# Patient Record
Sex: FEMALE | Race: WHITE
[De-identification: names, ages, dates, MRNs, and addresses within clinical notes are randomized per-mention and may not be internally consistent; named-entity substitution may affect disease eponyms.]

---

## 2017-04-11 ENCOUNTER — HOSPITAL ENCOUNTER (OUTPATIENT)
Dept: HOSPITAL 53 - M PAIN | Age: 57
End: 2017-04-11
Attending: NURSE PRACTITIONER
Payer: COMMERCIAL

## 2017-04-11 DIAGNOSIS — R51: ICD-10-CM

## 2017-04-11 DIAGNOSIS — N30.10: ICD-10-CM

## 2017-04-11 DIAGNOSIS — Z88.1: ICD-10-CM

## 2017-04-11 DIAGNOSIS — F41.9: ICD-10-CM

## 2017-04-11 DIAGNOSIS — F40.240: ICD-10-CM

## 2017-04-11 DIAGNOSIS — Z79.82: ICD-10-CM

## 2017-04-11 DIAGNOSIS — F32.9: ICD-10-CM

## 2017-04-11 DIAGNOSIS — Z90.49: ICD-10-CM

## 2017-04-11 DIAGNOSIS — Z88.0: ICD-10-CM

## 2017-04-11 DIAGNOSIS — M54.5: ICD-10-CM

## 2017-04-11 DIAGNOSIS — E11.9: ICD-10-CM

## 2017-04-11 DIAGNOSIS — I10: ICD-10-CM

## 2017-04-11 DIAGNOSIS — Z79.891: ICD-10-CM

## 2017-04-11 DIAGNOSIS — Z09: Primary | ICD-10-CM

## 2017-04-11 DIAGNOSIS — Z88.8: ICD-10-CM

## 2017-04-11 DIAGNOSIS — R10.10: ICD-10-CM

## 2017-04-11 DIAGNOSIS — Z79.899: ICD-10-CM

## 2017-04-12 NOTE — ECWPNPC
PATIENT NAME: JORDANA MILLER

: 1960

GENDER: FEMALE

MRN: [id number]

VISIT DATE: 2017

DISCHARGE DATE: 17 1033

VISIT LOCKED DATE TIME: 

PHYSICIAN: MONICA VILLAREAL  

RESOURCE: MONCIA VILLAREAL  

 

           

           

HISTORY OF PRESENT ILLNESS

           

      HISTORY OF PRESENT ILLNESS: HERE FOR F/U AND

          MANAGEMENT OF PERSISTENT ABDOMINAL PAIN. RATING PAIN VAS 8/10.

          HAD BILE DUCT SURGERY DUE TO DUCT ENLARGEMENT . HAS LARGE

          UPPER ABDOMINAL INCISION.HAVING LESS INTENSE RIGHT UPPER

          ABDOMINAL PAIN SINCE SURGERY. CURRENTLY USING MS CONTIN 15MG 1 IN

          AM AND 2HS, MSIR 15MG Q8H PRN,TRAMADOL 50MG 2 TAB QID,GABAPENTIN

          300MG TID AND TIZANIDINE 4MG QID.FINDS MEDICATION SOMEWHAT

          EFFECTIVE AND DENIES SIDE EFFECTS.REPORTING BOWEL AND BLADDER

          FUNCTION IS NORMAL.

      PAIN

           

           

          THE PATIENT DESCRIBES THE PAIN...

           

      FALL RISK SCREENING:

      SCREENING

           

           

          :NO FALLS IN THE PAST YEAR

           

CURRENT MEDICATIONS

           

          TAKING METOPROLOL TARTRATE 100 MG TABLET ORALLY

          TAKING OMEPRAZOLE 20 MG ORALLY ONCE A DAY

          TAKING AMITRIPTYLINE HCL 10 MG TABLET 3 TABLET ORALLY AT BEDTIME

          TAKING ALBUTEROL SULFATE  (90 BASE) MCG/ACT AEROSOL

          SOLUTION 2 PUFFS AS NEEDED INHALATION EVERY 4 HRS

          TAKING ASPIRIN ADULT LOW STRENGTH 81 MG 1 TABLET ORALLY ONCE A

          DAY

          TAKING MULTIVITAMINS TABLET ORALLY DAILY

          TAKING NITROGLYCERIN 0.4 MG SUBLINGUAL AS NEEDED

          TAKING VITAMIN D TABLET 500 UNITS ORALLY DAILY

          TAKING MORPHINE SULFATE ER 30 MG TABLET EXTENDED RELEASE 1-2 AS

          DIRECTED ORALLY 1 IN AM,2 AT HS MDD3

          TAKING AMITRIPTYLINE HCL 25 MG TABLET 3 ORALLY ONCE A DAY

          TAKING TRAMADOL HCL 50 MG TABLET 2 ORALLY Q6H PRN MDD8, NOTES:

          MAX DAILY DOSE 4 PILLS

          TAKING MORPHINE SULFATE ER 15 MG TABLET EXTENDED RELEASE 1 TABLET

          ORALLY 1 IN AM, 2 IN PM MDD3

          TAKING MORPHINE SULFATE 15 MG TABLET 1 TABLET AS NEEDED ORALLY

          Q8H PRN MDD3

          TAKING GABAPENTIN 300 MG CAPSULE 1 CAPSULE ORALLY THREE TIMES A

          DAY

          TAKING TIZANIDINE HCL 4 MG TABLET 1 TABLET AS NEEDED ORALLY FOUR

          TIMES DAILY PRN

          NOT-TAKING METFORMIN 500 MG 1 TAB ORAL DAILY

          UNKNOWN HYDROCODONE-ACETAMINOPHEN 5-325 MG TABLET 1 TABLET AS

          NEEDED ORALLY EVERY 4 HRS

          UNKNOWN ACETAMINOPHEN 500 MG TABLET 2 TABLET AS NEEDED ORALLY AS

          NEEDED

          UNKNOWN AMLODIPINE 5MG TABLET ORAL DAILY

          UNKNOWN CHLORTHALIDONE 25 MG TABLET 1 TABLET IN THE MORNING

          ORALLY ONCE A DAY

          UNKNOWN LISINOPRIL 20 MG TABLET 1 TABLET ORALLY ONCE A DAY

          MEDICATION LIST REVIEWED AND RECONCILED WITH THE PATIENT

           

PAST MEDICAL HISTORY

           

          HEADACHE

          HTN

          GERD

          HIATAL HERNIA

          ANXIETY

          DEPRESSION

          CLAUSTROPHOBIA

          INTERSTITAL CYSTITIS

           

ALLERGIES

           

          PENICILLIN (FOR ALLERGIES USE ONLY): THROAT SWELL

          CODEINE PHOSPHATE (FOR ALLERGIES USE ONLY): RASH

          VANCOMYCIN HCL: RASH

          COMPAZINE: HYPER

          CYMBALTA: GO CRAZY

          LYRICA: GO CRAZY

           

SURGICAL HISTORY

           

          HYSTERECTOMY 1984

          APPY 1971

          AMADOR 1985

          CONE BX 1977

          MULTIPLE BLADDER DILATIONS

          3-4 LAPEROSCOPIES

          NERVE STIMULATOR IMPLANTED

          BOWEL SURGERY 2017

           

SOCIAL HISTORY

           

          GENERAL:

           

          PAIN CLINIC PFS, CLERGY, PUBLIC HEALTH REFERRALS

          CLERGY REFERRAL NEEDED?NO

          WAS THE PROVIDER NOTIFIED OF ANY PERTINENT INFO?NO

          PFS REFERRAL NEEDED?NO

          PUBLIC HEALTH REFERRAL NEEDED?NO

           

           

          PATIENT: ____.

           

REVIEW OF SYSTEMS

           

      CONSTITUTIONAL:

           

          ANY CHANGE IN YOUR MEDICAL CONDITION? NO . CHILLS NO . FEVER NO .

           

      INFECTION:

           

          DO YOU HAVE NEW INFECTIONS? NO . DO YOU HAVE HISTORY OF MRSA? NO

          .

           

      MUSCULOSKELETAL:

           

          ANY NEW PATTERNS OF PAIN OR NUMBNESS? NO .

           

      GASTROENTEROLOGY:

           

          ANY NEW CHANGE IN BOWEL CONTROL? NO .

           

      GENITOURINARY:

           

          ANY NEW CHANGE IN BLADDER CONTROL? NO . IS THERE A CHANCE YOU

          COULD BE PREGNANT? NO .

           

      HEMATOLOGY/LYMPH:

           

          DO YOU TAKE ANY BLOOD THINNERS? (FOR EXAMPLE- COUMADIN, PLAVIX,

          AGGRENOX, PLATEL, PRADAXA, OR XARELTO) NO . WHEN WAS YOUR LAST

          DOSE? DATE: TIME: .

           

      NEUROLOGY:

           

          HAVE YOU FALLEN IN THE PAST 6 MONTHS? NO . ANY NEW EXTREMITY

          NUMBNESS OR WEAKNESS? NO .

           

      CARDIOLOGY:

           

          DO YOU HAVE A PACEMAKER OR DEFIBRILLATOR? NO .

           

      RESPIRATORY:

           

          HAVE YOU BEEN SICK IN THE PAST WEEK? NO . FEVER NO . FLU LIKE

          SYMPTOMS? NO . COUGH NO .

           

      INTEGUMENTARY:

           

          DO YOU HAVE ANY RASHES OR OPEN SORES? NO .

           

      ALLERGIC/IMMUNO:

           

          ARE YOU ALLERGIC TO SHELLFISH OR IV DYE? NO . ANY NEW ALLERGIES?

          NO .

           

      PSYCHIATRIC:

           

          DO YOU HAVE THOUGHTS OF HURTING YOURSELF OR SOMEONE ELSE? NO .

          ARE YOU ABUSED, NEGLECTED, OR IN AN UNSAFE ENVIRONMENT? NO .

           

      ENDOCRINOLOGY:

           

          ARE YOU DIABETIC? YES .

           

      OTHER:

           

          DO YOU NEED ANY PRESCRIPTIONS? YES SHORT ACTING AND LONG ACTING

          MORPHING . IF YES, PLEASE LIST: ____ . ANY NEW PROBLEMS WITH YOUR

          MEDICATIONS? NO . WHEN DID YOU LAST EAT? ____ . WHEN DID YOU LAST

          DRINK? ____ . WHAT DID YOU LAST DRINK? ____ . NAME OF PERSON

          DRIVING YOU HOME? ____ . DO YOU HAVE ANY OTHER QUESTIONS OR

          CONCERNS NO .

           

      REVIEWED BY:

           

          PROVIDER: MONICA CORBETT .

           

VITAL SIGNS

           

          .2 LBS, HT 64 IN, BMI 27.67 INDEX, /74 MM HG, 

          /MIN, RR 18 /MIN, TEMP 97.4 F, OXYGEN SAT % 95%, NA INITIALS SC

          10:03.

           

EXAMINATION

           

      GENERAL EXAMINATION:

          LUNGS:LUNG SOUNDS ARE CLEAR.

           

          HEART:HEART RATE REGULAR.

           

          ABDOMEN:TENDER TO PALPATION, DISTENDED.

           

          MUSCULOSKELETAL:*MUSCLE STRENGTH TESTING 5/5 BILATERAL, ,

          PALPATION: POSITIVE FOR PAIN OVER L/S SPINE. POSITIVE FOR PAIN

          OVER L/S PARSPINALS.

           

ASSESSMENTS

           

          INTERSTITIAL CYSTITIS - N30.10 (PRIMARY)

           

          CHRONIC PRESCRIPTION OPIATE USE - Z79.891

           

TREATMENT

           

      INTERSTITIAL CYSTITIS

          CONTINUE AMITRIPTYLINE HCL TABLET, 25 MG, 3, ORALLY, ONCE A DAY

          CONTINUE TRAMADOL HCL TABLET, 50 MG, 2, ORALLY, Q6H PRN MDD8,

          NOTES: MAX DAILY DOSE 4 PILLS

          REFILL MORPHINE SULFATE ER TABLET EXTENDED RELEASE, 15 MG, 1

          TABLET, ORALLY, 1 IN AM, 2 IN PM MDD3, 30 DAY(S), 90, REFILLS 0

          REFILL MORPHINE SULFATE TABLET, 15 MG, 1 TABLET AS NEEDED,

          ORALLY, Q8H PRN MDD3, 30 DAY(S), 90, REFILLS 0

          NOTES: ISTOP REGISTRY REVIEWED AND DEMNOSTRATES COMPLLIANCE.

          BRINGS IN MEDICATIONS WHICH IS APPROPRIATE FOR WHAT WAS

          DISPENSED. RECENT URINE TOXICOLOGY REVIEWED. NO UNAUTHORIZED

          MEDICATIONS. NO ILLICIT SUBSTANCES AND PRESCRIBED MEDICATIONS

          WERE PRESENT. , RISKS AND BENEFITS OF NARCOTIC/OPIOD MEDICATIONS

          WERE REVIEWED WITH PATIENT - THIS INCLUDES BUT IS NOT LIMITED TO

          RISK OF DEPENDANCE/DEVELOPMENT OF ADDICTION, MOOD DISTURBANCE AND

          DEPRESSION, OSTEOPOROSIS, HORMONAL AND LABIDAL CHANGES,

          RESPIRATORY DEPRESSION AND DEATH. PATIENT IS ADVISED NOT TO DRIVE

          WHILE ON THESE MEDICATIONS.

           

PROCEDURE CODES

           

           ESTABILISHED PATIENT [facility] CHARGE

           

DISPOSITION & COMMUNICATION

           

FOLLOW UP

           

          2 MONTHS

           

 

ELECTRONICALLY SIGNED BY CHELLE RODRIGUEZ ON

          2017 AT 12:27 PM EDT

           

           

           

 

DISCLAIMER :

THIS IS A VISIT SUMMARY EXTRACTED FROM THE [employer] CHART.

IT IS NOT A COPY OF THE [employer] PROGRESS NOTE.

DWAINE

## 2017-06-12 ENCOUNTER — HOSPITAL ENCOUNTER (OUTPATIENT)
Dept: HOSPITAL 53 - M PAIN | Age: 57
End: 2017-06-12
Attending: NURSE PRACTITIONER
Payer: COMMERCIAL

## 2017-06-12 DIAGNOSIS — F40.240: ICD-10-CM

## 2017-06-12 DIAGNOSIS — Z88.1: ICD-10-CM

## 2017-06-12 DIAGNOSIS — F41.9: ICD-10-CM

## 2017-06-12 DIAGNOSIS — K21.9: ICD-10-CM

## 2017-06-12 DIAGNOSIS — F32.9: ICD-10-CM

## 2017-06-12 DIAGNOSIS — I10: ICD-10-CM

## 2017-06-12 DIAGNOSIS — Z79.891: ICD-10-CM

## 2017-06-12 DIAGNOSIS — Z79.899: ICD-10-CM

## 2017-06-12 DIAGNOSIS — E11.9: ICD-10-CM

## 2017-06-12 DIAGNOSIS — Z88.5: ICD-10-CM

## 2017-06-12 DIAGNOSIS — G89.29: Primary | ICD-10-CM

## 2017-06-12 DIAGNOSIS — Z88.0: ICD-10-CM

## 2017-06-12 DIAGNOSIS — R51: ICD-10-CM

## 2017-06-12 DIAGNOSIS — K44.9: ICD-10-CM

## 2017-06-12 DIAGNOSIS — N30.10: ICD-10-CM

## 2017-06-12 DIAGNOSIS — Z91.013: ICD-10-CM

## 2017-06-12 DIAGNOSIS — Z79.82: ICD-10-CM

## 2017-06-12 DIAGNOSIS — Z88.8: ICD-10-CM

## 2017-06-14 NOTE — ECWPNPC
PATIENT NAME: JORDANA MILLER

: 1960

GENDER: FEMALE

MRN: [id number]

VISIT DATE: 2017

DISCHARGE DATE: 17 1441

VISIT LOCKED DATE TIME: 

PHYSICIAN: MONICA VILLAREAL  

RESOURCE: MONICA VILLAREAL  

 

           

           

REASON FOR APPOINTMENT

           

          1. BLADDER/BACK

           

HISTORY OF PRESENT ILLNESS

           

      HISTORY OF PRESENT ILLNESS: HERE FOR F/U AND

          MANAGEMENT OF PERSISTENT ABDOMINAL PAIN. RATING PAIN VAS 7/10.

          HAD BILE DUCT SURGERY DUE TO DUCT ENLARGEMENT . HAS LARGE

          UPPER ABDOMINAL INCISION.HAVING LESS INTENSE RIGHT UPPER

          ABDOMINAL PAIN SINCE SURGERY. CURRENTLY USING MS CONTIN 15MG 1 IN

          AM AND 2HS, MSIR 15MG Q8H PRN,TRAMADOL 50MG 2 TAB QID,GABAPENTIN

          300MG TID AND TIZANIDINE 4MG QID.FINDS MEDICATION SOMEWHAT

          EFFECTIVE AND DENIES SIDE EFFECTS.REPORTING BOWEL AND BLADDER

          FUNCTION IS NORMAL.

      PAIN

           

           

          THE PATIENT DESCRIBES THE PAIN...

           

      FALL RISK SCREENING:

      SCREENING

           

           

          :NO FALLS IN THE PAST YEAR

           

CURRENT MEDICATIONS

           

          TAKING METOPROLOL TARTRATE 100 MG TABLET ORALLY BID

          TAKING OMEPRAZOLE 20 MG ORALLY ONCE A DAY

          TAKING ALBUTEROL SULFATE  (90 BASE) MCG/ACT AEROSOL

          SOLUTION 2 PUFFS AS NEEDED INHALATION EVERY 4 HRS

          TAKING ASPIRIN ADULT LOW STRENGTH 81 MG 1 TABLET ORALLY ONCE A

          DAY

          TAKING MULTIVITAMINS TABLET ORALLY DAILY

          TAKING NITROGLYCERIN 0.4 MG SUBLINGUAL AS NEEDED

          TAKING AMITRIPTYLINE HCL 25 MG TABLET 3 ORALLY ONCE A DAY

          TAKING TRAMADOL HCL 50 MG TABLET 2 ORALLY Q6H PRN MDD8, NOTES:

          MAX DAILY DOSE 4 PILLS

          TAKING MORPHINE SULFATE ER 15 MG TABLET EXTENDED RELEASE 1 TABLET

          ORALLY 1 IN AM, 2 IN PM MDD3

          TAKING MORPHINE SULFATE 15 MG TABLET 1 TABLET AS NEEDED ORALLY

          Q8H PRN MDD3

          TAKING GABAPENTIN 300 MG CAPSULE 1 CAPSULE ORALLY THREE TIMES A

          DAY

          TAKING TIZANIDINE HCL 4 MG TABLET 1 TABLET AS NEEDED ORALLY FOUR

          TIMES DAILY PRN

          NOT-TAKING MORPHINE SULFATE ER 30 MG TABLET EXTENDED RELEASE 1-2

          AS DIRECTED ORALLY 1 IN AM,2 AT HS MDD3

          NOT-TAKING AMITRIPTYLINE HCL 10 MG TABLET 3 TABLET ORALLY AT

          BEDTIME

          NOT-TAKING VITAMIN D TABLET 500 UNITS ORALLY DAILY

          NOT-TAKING METFORMIN 500 MG 1 TAB ORAL DAILY

          UNKNOWN HYDROCODONE-ACETAMINOPHEN 5-325 MG TABLET 1 TABLET AS

          NEEDED ORALLY EVERY 4 HRS

          UNKNOWN ACETAMINOPHEN 500 MG TABLET 2 TABLET AS NEEDED ORALLY AS

          NEEDED

          UNKNOWN AMLODIPINE 5MG TABLET ORAL DAILY

          UNKNOWN CHLORTHALIDONE 25 MG TABLET 1 TABLET IN THE MORNING

          ORALLY ONCE A DAY

          UNKNOWN LISINOPRIL 20 MG TABLET 1 TABLET ORALLY ONCE A DAY

          MEDICATION LIST REVIEWED AND RECONCILED WITH THE PATIENT

           

PAST MEDICAL HISTORY

           

          HEADACHE

          HTN

          GERD

          HIATAL HERNIA

          ANXIETY

          DEPRESSION

          CLAUSTROPHOBIA

          INTERSTITAL CYSTITIS

           

ALLERGIES

           

          PENICILLIN (FOR ALLERGIES USE ONLY): THROAT SWELL: ALLERGY

          CODEINE PHOSPHATE (FOR ALLERGIES USE ONLY): RASH: ALLERGY

          VANCOMYCIN HCL: RASH: ALLERGY

          COMPAZINE: HYPER: SIDE EFFECTS

          CYMBALTA: GO CRAZY: SIDE EFFECTS

          LYRICA: GO CRAZY

          SHELL FISH, IV DYE: THROAT SWELLS: ALLERGY

           

REVIEW OF SYSTEMS

           

      CONSTITUTIONAL:

           

          ANY CHANGE IN YOUR MEDICAL CONDITION? NO . CHILLS NO . FEVER NO .

           

      INFECTION:

           

          DO YOU HAVE NEW INFECTIONS? NO . DO YOU HAVE HISTORY OF MRSA? NO

          .

           

      MUSCULOSKELETAL:

           

          ANY NEW PATTERNS OF PAIN OR NUMBNESS? NO .

           

      GASTROENTEROLOGY:

           

          ANY NEW CHANGE IN BOWEL CONTROL? NO .

           

      GENITOURINARY:

           

          ANY NEW CHANGE IN BLADDER CONTROL? NO . IS THERE A CHANCE YOU

          COULD BE PREGNANT? NO .

           

      HEMATOLOGY/LYMPH:

           

          DO YOU TAKE ANY BLOOD THINNERS? (FOR EXAMPLE- COUMADIN, PLAVIX,

          AGGRENOX, PLATEL, PRADAXA, OR XARELTO) NO . WHEN WAS YOUR LAST

          DOSE? DATE: TIME: .

           

      NEUROLOGY:

           

          HAVE YOU FALLEN IN THE PAST 6 MONTHS? NO . ANY NEW EXTREMITY

          NUMBNESS OR WEAKNESS? NO .

           

      CARDIOLOGY:

           

          DO YOU HAVE A PACEMAKER OR DEFIBRILLATOR? NO .

           

      RESPIRATORY:

           

          HAVE YOU BEEN SICK IN THE PAST WEEK? NO . FEVER NO . FLU LIKE

          SYMPTOMS? NO . COUGH NO .

           

      INTEGUMENTARY:

           

          DO YOU HAVE ANY RASHES OR OPEN SORES? YES, HEALING ABD INCISION .

           

      ALLERGIC/IMMUNO:

           

          ARE YOU ALLERGIC TO SHELLFISH OR IV DYE? YES, BOTH . ANY NEW

          ALLERGIES? NO .

           

      PSYCHIATRIC:

           

          DO YOU HAVE THOUGHTS OF HURTING YOURSELF OR SOMEONE ELSE? NO .

          ARE YOU ABUSED, NEGLECTED, OR IN AN UNSAFE ENVIRONMENT? NO .

           

      ENDOCRINOLOGY:

           

          ARE YOU DIABETIC? YES, DIET CONTROLLED .

           

      OTHER:

           

          DO YOU NEED ANY PRESCRIPTIONS? YES . IF YES, PLEASE LIST:

          MORPHINE LONG AND SHORT ACTING, AMITRIPTYLINE . ANY NEW PROBLEMS

          WITH YOUR MEDICATIONS? NO . WHEN DID YOU LAST EAT? ____ . WHEN

          DID YOU LAST DRINK? ____ . WHAT DID YOU LAST DRINK? ____ . NAME

          OF PERSON DRIVING YOU HOME? ____ . DO YOU HAVE ANY OTHER

          QUESTIONS OR CONCERNS NO .

           

      REVIEWED BY:

           

          PROVIDER: MONICA CORBETT .

           

VITAL SIGNS

           

          .8 LBS, HT 64 IN, BMI 27.25 INDEX, BP 96/64 MM HG, REPEAT

          /70 MANUAL, HR 66 /MIN, RR 16 /MIN, TEMP 97.6 F, OXYGEN SAT

          % 93%, NA INITIALS TL 1343, REVIEWED BY: AD.

           

EXAMINATION

           

      GENERAL EXAMINATION:

          LUNGS:LUNG SOUNDS ARE CLEAR.

           

          HEART:HEART RATE REGULAR.

           

          ABDOMEN:TENDER TO PALPATION, DISTENDED.

           

          MUSCULOSKELETAL:*MUSCLE STRENGTH TESTING 5/5 BILATERAL, ,

          PALPATION: POSITIVE FOR PAIN OVER L/S SPINE. POSITIVE FOR PAIN

          OVER L/S PARSPINALS.

           

ASSESSMENTS

           

          INTERSTITIAL CYSTITIS - N30.10 (PRIMARY)

           

          CHRONIC PRESCRIPTION OPIATE USE - Z79.891

           

TREATMENT

           

      INTERSTITIAL CYSTITIS

          REFILL MORPHINE SULFATE ER TABLET EXTENDED RELEASE, 30 MG, 1-2 AS

          DIRECTED, ORALLY, 1 IN AM,2 AT HS MDD3, 30 DAY(S), 90, REFILLS 0

          REFILL MORPHINE SULFATE TABLET, 15 MG, 1 TABLET AS NEEDED,

          ORALLY, Q8H PRN MDD3, 30 DAY(S), 90, REFILLS 0

          REFILL TRAMADOL HCL TABLET, 50 MG, 2, ORALLY, Q6H PRN MDD8, 30

          DAY(S), 240, REFILLS 2, NOTES: MAX DAILY DOSE 4 PILLS

          CONTINUE AMITRIPTYLINE HCL TABLET, 25 MG, 3, ORALLY, ONCE A DAY

          CONTINUE GABAPENTIN CAPSULE, 300 MG, 1 CAPSULE, ORALLY, THREE

          TIMES A DAY

          CONTINUE TIZANIDINE HCL TABLET, 4 MG, 1 TABLET AS NEEDED, ORALLY,

          FOUR TIMES DAILY PRN

          NOTES: ISTOP REGISTRY REVIEWED AND DEMNOSTRATES COMPLLIANCE.

          BRINGS IN MEDICATIONS WHICH IS APPROPRIATE FOR WHAT WAS

          DISPENSED. RECENT URINE TOXICOLOGY REVIEWED. NO UNAUTHORIZED

          MEDICATIONS. NO ILLICIT SUBSTANCES AND PRESCRIBED MEDICATIONS

          WERE PRESENT. , RISKS AND BENEFITS OF NARCOTIC/OPIOD MEDICATIONS

          WERE REVIEWED WITH PATIENT - THIS INCLUDES BUT IS NOT LIMITED TO

          RISK OF DEPENDANCE/DEVELOPMENT OF ADDICTION, MOOD DISTURBANCE AND

          DEPRESSION, OSTEOPOROSIS, HORMONAL AND LABIDAL CHANGES,

          RESPIRATORY DEPRESSION AND DEATH. PATIENT IS ADVISED NOT TO DRIVE

          WHILE ON THESE MEDICATIONS.URINE TOX TODAY.PATIENT WAS NOT ABLE

          TO URINATE.JORDANA ADMITS THAT SHE OVERTOOK MSIR 15MG OVER THE PAST

          6 WEEKS AND HAS BEEN OUT FOR TWO WEEKS.SHE IS NOT DUE UNTIL

          .HER ABDOMINAL INCISION IS VERY RED TODAY WITH SOME

          EXUDATE.JOSE INFORMED HER THAT THIS NEEDS TO BE LOOKED AT BY

          URGENT CARE.INFORMED HER AND HER  ABOUT MY CONCERN FOR HER

          OVERTAKING MSIR 15MG AND HAVE ADVISED HER TO CUT BACK,WHICH SHE

          IS RECEPTIVE TO.SHE WILL F/U IN ONE MONTH.ALSO DIDNT BRING HER

          MEDICATION WITH HER TODAY.

           

PROCEDURE CODES

           

           ESTABILISHED PATIENT [facility] CHARGE

           

DISPOSITION & COMMUNICATION

           

FOLLOW UP

           

          4 WEEKS

           

 

ELECTRONICALLY SIGNED BY CHELLE RODRIGUEZ ON

          2017 AT 04:50 PM EDT

           

           

           

 

DISCLAIMER :

THIS IS A VISIT SUMMARY EXTRACTED FROM THE [employer] CHART.

IT IS NOT A COPY OF THE [employer] PROGRESS NOTE.

DWAINE

## 2017-08-01 ENCOUNTER — HOSPITAL ENCOUNTER (OUTPATIENT)
Dept: HOSPITAL 53 - M PAIN | Age: 57
End: 2017-08-01
Attending: NURSE PRACTITIONER
Payer: MEDICAID

## 2017-08-01 DIAGNOSIS — K44.9: ICD-10-CM

## 2017-08-01 DIAGNOSIS — F41.9: ICD-10-CM

## 2017-08-01 DIAGNOSIS — Z88.0: ICD-10-CM

## 2017-08-01 DIAGNOSIS — G89.29: Primary | ICD-10-CM

## 2017-08-01 DIAGNOSIS — Z79.899: ICD-10-CM

## 2017-08-01 DIAGNOSIS — Z88.5: ICD-10-CM

## 2017-08-01 DIAGNOSIS — K21.9: ICD-10-CM

## 2017-08-01 DIAGNOSIS — Z91.013: ICD-10-CM

## 2017-08-01 DIAGNOSIS — R10.84: ICD-10-CM

## 2017-08-01 DIAGNOSIS — Z88.8: ICD-10-CM

## 2017-08-01 DIAGNOSIS — Z91.048: ICD-10-CM

## 2017-08-01 DIAGNOSIS — F32.9: ICD-10-CM

## 2017-08-01 DIAGNOSIS — F40.240: ICD-10-CM

## 2017-08-01 DIAGNOSIS — N30.10: ICD-10-CM

## 2017-08-01 DIAGNOSIS — I10: ICD-10-CM

## 2017-08-01 DIAGNOSIS — Z88.1: ICD-10-CM

## 2017-08-01 DIAGNOSIS — Z79.891: ICD-10-CM

## 2017-08-01 DIAGNOSIS — Z79.84: ICD-10-CM

## 2017-08-01 DIAGNOSIS — E11.9: ICD-10-CM

## 2017-08-18 NOTE — ECWPNPC
PATIENT NAME: JORDANA MILLER

: 1960

GENDER: FEMALE

MRN: [id number]

VISIT DATE: 2017

DISCHARGE DATE: 17 1329

VISIT LOCKED DATE TIME: 

PHYSICIAN: MONICA VILLAREAL  

RESOURCE: MONICA VILLAREAL  

 

           

           

REASON FOR APPOINTMENT

           

          1. BLADDER/BACK

           

HISTORY OF PRESENT ILLNESS

           

      HISTORY OF PRESENT ILLNESS: HERE FOR F/U AND

          MANAGEMENT OF PERSISTENT ABDOMINAL PAIN. RATING PAIN VAS 7/10.

          HAD BILE DUCT SURGERY DUE TO DUCT ENLARGEMENT . HAS LARGE

          UPPER ABDOMINAL INCISION.HAVING LESS INTENSE RIGHT UPPER

          ABDOMINAL PAIN SINCE SURGERY. CURRENTLY USING MS CONTIN 30MG 1 IN

          AM AND 2HS, MSIR 15MG Q8H PRN,TRAMADOL 50MG 2 TAB QID,GABAPENTIN

          300MG TID AND TIZANIDINE 4MG QID.FINDS MEDICATION SOMEWHAT

          EFFECTIVE AND DENIES SIDE EFFECTS.REPORTING BOWEL AND BLADDER

          FUNCTION IS NORMAL.

      PAIN

           

           

          THE PATIENT DESCRIBES THE PAIN...

           

      FALL RISK SCREENING:

      SCREENING

           

           

          :NO FALLS IN THE PAST YEAR

           

CURRENT MEDICATIONS

           

          TAKING METOPROLOL TARTRATE 100 MG TABLET ORALLY BID

          TAKING OMEPRAZOLE 20 MG ORALLY ONCE A DAY

          TAKING ALBUTEROL SULFATE  (90 BASE) MCG/ACT AEROSOL

          SOLUTION 2 PUFFS AS NEEDED INHALATION EVERY 4 HRS

          TAKING ASPIRIN ADULT LOW STRENGTH 81 MG 1 TABLET ORALLY ONCE A

          DAY

          TAKING MULTIVITAMINS TABLET 1 TAB ORALLY DAILY

          TAKING NITROGLYCERIN 0.4 MG SUBLINGUAL AS NEEDED

          TAKING GABAPENTIN 300 MG CAPSULE 1 CAPSULE ORALLY THREE TIMES A

          DAY

          TAKING TIZANIDINE HCL 4 MG TABLET 1 TABLET AS NEEDED ORALLY FOUR

          TIMES DAILY PRN

          TAKING AMITRIPTYLINE HCL 25 MG TABLET 3 TABS ORALLY ONCE A DAY

          TAKING MORPHINE SULFATE ER 30 MG TABLET EXTENDED RELEASE 1-2 AS

          DIRECTED ORALLY 1 IN AM,2 AT HS MDD3

          TAKING MORPHINE SULFATE 15 MG TABLET 1 TABLET AS NEEDED ORALLY

          Q8H PRN MDD3

          TAKING TRAMADOL HCL 50 MG TABLET 1-2 TABS ORALLY Q6H PRN MDD8,

          NOTES: MAX DAILY DOSE 4 PILLS

          NOT-TAKING MORPHINE SULFATE ER 15 MG TABLET EXTENDED RELEASE 1

          TABLET ORALLY 1 IN AM, 2 IN PM MDD3

          NOT-TAKING AMITRIPTYLINE HCL 10 MG TABLET 3 TABLET ORALLY AT

          BEDTIME

          NOT-TAKING VITAMIN D TABLET 500 UNITS ORALLY DAILY

          NOT-TAKING METFORMIN 500 MG 1 TAB ORAL DAILY

          UNKNOWN HYDROCODONE-ACETAMINOPHEN 5-325 MG TABLET 1 TABLET AS

          NEEDED ORALLY EVERY 4 HRS

          UNKNOWN ACETAMINOPHEN 500 MG TABLET 2 TABLET AS NEEDED ORALLY AS

          NEEDED

          UNKNOWN AMLODIPINE 5MG TABLET ORAL DAILY

          UNKNOWN CHLORTHALIDONE 25 MG TABLET 1 TABLET IN THE MORNING

          ORALLY ONCE A DAY

          UNKNOWN LISINOPRIL 20 MG TABLET 1 TABLET ORALLY ONCE A DAY

          MEDICATION LIST REVIEWED AND RECONCILED WITH THE PATIENT

           

PAST MEDICAL HISTORY

           

          HEADACHE

          HTN

          GERD

          HIATAL HERNIA

          ANXIETY

          DEPRESSION

          CLAUSTROPHOBIA

          INTERSTITAL CYSTITIS

           

ALLERGIES

           

          PENICILLIN (FOR ALLERGIES USE ONLY): THROAT SWELL: ALLERGY

          CODEINE PHOSPHATE (FOR ALLERGIES USE ONLY): RASH: ALLERGY

          VANCOMYCIN HCL: RASH: ALLERGY

          COMPAZINE: HYPER: SIDE EFFECTS

          CYMBALTA: GO CRAZY: SIDE EFFECTS

          LYRICA: GO CRAZY

          SHELL FISH, IV DYE: THROAT SWELLS: ALLERGY

          ADHESIVE TAPE: BLISTERS: ALLERGY

           

SOCIAL HISTORY

           

          GENERAL:

           

          TOBACCO USE

          ARE YOU A:NONSMOKER

           

           

          [employer]

          [id number]

           

           

          LEARNING BARRIERS / SPECIAL NEEDS

          BARRIERS TO LEARNING?NO

          HEARING IMPAIRED?NO

          VISION IMPAIRED?YES

          :CORRECTIVE LENSES

          COGNITIVELY IMPAIRED?NO

          READINESS TO LEARN?YES

          LEARNING PREFERENCES?NO

          LEARNING CAPABILITIES PRESENT?YES

          EMOTIONAL BARRIERS?NO

          SPECIAL DEVICES?NO

           NEEDED?NO

           

           

          PAIN CLINIC PFS, CLERGY, PUBLIC HEALTH REFERRALS

          PFS REFERRAL NEEDED?NO

          CLERGY REFERRAL NEEDED?NO

          PUBLIC HEALTH REFERRAL NEEDED?NO

          WAS THE PROVIDER NOTIFIED OF ANY PERTINENT INFO?NO

          HAS THE PATIENT BEEN EDUCATED REGARDING HIS/HER PLAN OF CARE?YES

          HAS THE PATIENT BEEN EDUCATED REGARDING PAIN, THE RISK FOR PAIN,

          THE IMPORTANCE OF EFFECTIVE PAIN MANAGEMENT, AND THE PAIN

          ASSESSMENT PROCESS?YES

           

           

          PATIENT: ____.

           

           

          ADVANCE DIRECTIVES

          HEALTH CARE PROXY?NO

          WOULD YOU LIKE MORE INFORMATION?NO

          DO YOU HAVE A DNR?NO

          WOULD YOU LIKE MORE INFORMATION?NO

          LIVING WILL?NO

          WOULD YOU LIKE MORE INFORMATION?NO

          POWER OF ?NO

          WOULD YOU LIKE MORE INFORMATION?NO

           

REVIEW OF SYSTEMS

           

      REVIEWED BY:

           

          PROVIDER: MONICA CORBETT .

           

      CONSTITUTIONAL:

           

          ANY CHANGE IN YOUR MEDICAL CONDITION? NO . CHILLS NO . FEVER NO .

           

      INFECTION:

           

          DO YOU HAVE NEW INFECTIONS? NO . DO YOU HAVE HISTORY OF MRSA? NO

          .

           

      MUSCULOSKELETAL:

           

          ANY NEW PATTERNS OF PAIN OR NUMBNESS? NO .

           

      GASTROENTEROLOGY:

           

          ANY NEW CHANGE IN BOWEL CONTROL? NO .

           

      GENITOURINARY:

           

          ANY NEW CHANGE IN BLADDER CONTROL? NO . IS THERE A CHANCE YOU

          COULD BE PREGNANT? NO .

           

      HEMATOLOGY/LYMPH:

           

          DO YOU TAKE ANY BLOOD THINNERS? (FOR EXAMPLE- COUMADIN, PLAVIX,

          AGGRENOX, PLATEL, PRADAXA, OR XARELTO) NO . WHEN WAS YOUR LAST

          DOSE? DATE: TIME: .

           

      NEUROLOGY:

           

          HAVE YOU FALLEN IN THE PAST 6 MONTHS? YES . ANY NEW EXTREMITY

          NUMBNESS OR WEAKNESS? NO .

           

      CARDIOLOGY:

           

          DO YOU HAVE A PACEMAKER OR DEFIBRILLATOR? NO .

           

      RESPIRATORY:

           

          HAVE YOU BEEN SICK IN THE PAST WEEK? NO . FEVER NO . FLU LIKE

          SYMPTOMS? NO . COUGH NO .

           

      INTEGUMENTARY:

           

          DO YOU HAVE ANY RASHES OR OPEN SORES? YES .

           

      ALLERGIC/IMMUNO:

           

          ARE YOU ALLERGIC TO SHELLFISH OR IV DYE? YES . ANY NEW ALLERGIES?

          YES, ADHESIVE TAPE .

           

      PSYCHIATRIC:

           

          DO YOU HAVE THOUGHTS OF HURTING YOURSELF OR SOMEONE ELSE? NO .

          ARE YOU ABUSED, NEGLECTED, OR IN AN UNSAFE ENVIRONMENT? NO .

           

      ENDOCRINOLOGY:

           

          ARE YOU DIABETIC? YES .

           

      OTHER:

           

          DO YOU NEED ANY PRESCRIPTIONS? YES . IF YES, PLEASE LIST: ____ .

          ANY NEW PROBLEMS WITH YOUR MEDICATIONS? NO . WHEN DID YOU LAST

          EAT? ____ . WHEN DID YOU LAST DRINK? ____ . WHAT DID YOU LAST

          DRINK? ____ . NAME OF PERSON DRIVING YOU HOME? ____ . DO YOU HAVE

          ANY OTHER QUESTIONS OR CONCERNS NO .

           

VITAL SIGNS

           

          .4 LBS, HT 64 IN, BMI 28.56 INDEX, /98 MANUAL RIGHT

          ARM, HR 89 /MIN, RR 16 /MIN, TEMP 99.0 F, OXYGEN SAT % 100%,

          REVIEWED BY: CS.

           

EXAMINATION

           

      GENERAL EXAMINATION:

          LUNGS:LUNG SOUNDS ARE CLEAR.

           

          HEART:HEART RATE REGULAR.

           

          ABDOMEN:TENDER TO PALPATION, DISTENDED.

           

          MUSCULOSKELETAL:*MUSCLE STRENGTH TESTING 5/5 BILATERAL, ,

          PALPATION: POSITIVE FOR PAIN OVER L/S SPINE. POSITIVE FOR PAIN

          OVER L/S PARSPINALS.

           

ASSESSMENTS

           

          INTERSTITIAL CYSTITIS - N30.10 (PRIMARY)

           

          CHRONIC PRESCRIPTION OPIATE USE - Z79.891

           

          GENERALIZED ABDOMINAL PAIN - R10.84

           

TREATMENT

           

      INTERSTITIAL CYSTITIS

          REFILL GABAPENTIN CAPSULE, 300 MG, 1 CAPSULE, ORALLY, THREE TIMES

          A DAY, 30 DAY(S), 90 CAPSULE, REFILLS 2

          CONTINUE TIZANIDINE HCL TABLET, 4 MG, 1 TABLET AS NEEDED, ORALLY,

          FOUR TIMES DAILY PRN

          CONTINUE AMITRIPTYLINE HCL TABLET, 25 MG, 3 TABS, ORALLY, ONCE A

          DAY

          REFILL MORPHINE SULFATE ER TABLET EXTENDED RELEASE, 30 MG, 1-2 AS

          DIRECTED, ORALLY, 1 IN AM,2 AT HS MDD3, 30 DAY(S), 90, REFILLS 0

          REFILL MORPHINE SULFATE TABLET, 15 MG, 1 TABLET AS NEEDED,

          ORALLY, Q8H PRN MDD3, 30 DAY(S), 90, REFILLS 0

          REFILL TRAMADOL HCL TABLET, 50 MG, 1-2 TABS, ORALLY, Q6H PRN

          MDD8, 30 DAY(S), 240, REFILLS 2, NOTES: MAX DAILY DOSE 4 PILLS

          START GABAPENTIN TABLET, 600 MG, 1 TABLET, ORALLY, THREE TIMES A

          DAY, 30 DAY(S), 90, REFILLS 2

           

PROCEDURE CODES

           

           ESTABILISHED PATIENT [facility] CHARGE

           

DISPOSITION & COMMUNICATION

           

FOLLOW UP

           

          6 WEEKS

           

 

ELECTRONICALLY SIGNED BY CHELLE RODRIGUEZ ON

          2017 AT 06:52 PM EDT

           

           

           

 

DISCLAIMER :

THIS IS A VISIT SUMMARY EXTRACTED FROM THE [employer] CHART.

IT IS NOT A COPY OF THE [employer] PROGRESS NOTE.

DWAINE

## 2017-11-02 ENCOUNTER — HOSPITAL ENCOUNTER (OUTPATIENT)
Dept: HOSPITAL 53 - M PAIN | Age: 57
End: 2017-11-02
Attending: NURSE PRACTITIONER
Payer: COMMERCIAL

## 2017-11-02 DIAGNOSIS — Z88.5: ICD-10-CM

## 2017-11-02 DIAGNOSIS — L23.1: ICD-10-CM

## 2017-11-02 DIAGNOSIS — F32.9: ICD-10-CM

## 2017-11-02 DIAGNOSIS — G89.29: Primary | ICD-10-CM

## 2017-11-02 DIAGNOSIS — Z88.1: ICD-10-CM

## 2017-11-02 DIAGNOSIS — Z98.890: ICD-10-CM

## 2017-11-02 DIAGNOSIS — Z88.0: ICD-10-CM

## 2017-11-02 DIAGNOSIS — Z79.899: ICD-10-CM

## 2017-11-02 DIAGNOSIS — Z88.8: ICD-10-CM

## 2017-11-02 DIAGNOSIS — N30.10: ICD-10-CM

## 2017-11-02 DIAGNOSIS — Z79.82: ICD-10-CM

## 2017-11-02 DIAGNOSIS — F41.9: ICD-10-CM

## 2017-11-02 DIAGNOSIS — K44.9: ICD-10-CM

## 2017-11-02 DIAGNOSIS — Z79.891: ICD-10-CM

## 2017-11-02 DIAGNOSIS — I10: ICD-10-CM

## 2017-11-02 DIAGNOSIS — Z91.013: ICD-10-CM

## 2017-11-02 DIAGNOSIS — K21.9: ICD-10-CM

## 2017-11-02 DIAGNOSIS — E11.9: ICD-10-CM

## 2017-11-21 NOTE — ECWPNPC
PATIENT NAME: JORDANA MILLER

: 1960

GENDER: FEMALE

MRN: [id number]

VISIT DATE: 2017

DISCHARGE DATE: 17 1158

VISIT LOCKED DATE TIME: 

PHYSICIAN: MONICA VILLAREAL  

RESOURCE: MONICA VILLAREAL  

 

           

           

REASON FOR APPOINTMENT

           

          1. BLADDER/BACK

           

HISTORY OF PRESENT ILLNESS

           

      HISTORY OF PRESENT ILLNESS:  HERE FOR F/U AND

          MANAGEMENT OF PERSISTENT ABDOMINAL PAIN. RATING PAIN VAS 7/10.

          HAD BILE DUCT SURGERY DUE TO DUCT ENLARGEMENT . HAS LARGE

          UPPER ABDOMINAL INCISION.HAVING LESS INTENSE RIGHT UPPER

          ABDOMINAL PAIN SINCE SURGERY. CURRENTLY USING MS CONTIN 30MG 1 IN

          AM AND 2HS, MSIR 15MG Q8H PRN,TRAMADOL 50MG 2 TAB QID,GABAPENTIN

          300MG TID AND TIZANIDINE 4MG QID.FINDS MEDICATION SOMEWHAT

          EFFECTIVE AND DENIES SIDE EFFECTS.REPORTING BOWEL AND BLADDER

          FUNCTION IS NORMAL.

      PAIN

           

           

          THE PATIENT DESCRIBES THE PAIN...

           

      PAIN

           

           

          THE PATIENT DESCRIBES THE PAIN...

           

      FALL RISK SCREENING:

      SCREENING

           

           

          :NO FALLS IN THE PAST YEAR

           

CURRENT MEDICATIONS

           

          TAKING METOPROLOL TARTRATE 100 MG TABLET ORALLY BID

          TAKING OMEPRAZOLE 20 MG ORALLY ONCE A DAY

          TAKING ALBUTEROL SULFATE  (90 BASE) MCG/ACT AEROSOL

          SOLUTION 2 PUFFS AS NEEDED INHALATION EVERY 4 HRS

          TAKING ASPIRIN ADULT LOW STRENGTH 81 MG 1 TABLET ORALLY ONCE A

          DAY

          TAKING MULTIVITAMINS TABLET 1 TAB ORALLY DAILY

          TAKING NITROGLYCERIN 0.4 MG SUBLINGUAL AS NEEDED

          TAKING GABAPENTIN 300 MG CAPSULE 1 CAPSULE ORALLY THREE TIMES A

          DAY

          TAKING TIZANIDINE HCL 4 MG TABLET 1 TABLET AS NEEDED ORALLY FOUR

          TIMES DAILY PRN

          TAKING AMITRIPTYLINE HCL 25 MG TABLET 3 TABS ORALLY BEFORE

          BEDTIME

          TAKING TRAMADOL HCL 50 MG TABLET 1-2 TABS ORALLY Q6H PRN MDD8

          TAKING MORPHINE SULFATE 15 MG TABLET 1 TABLET AS NEEDED ORALLY

          Q8H PRN MDD3

          TAKING GABAPENTIN 600 MG TABLET 1 TABLET ORALLY THREE TIMES A DAY

          NOT-TAKING FENTANYL 12 MCG/HR PATCH 72 HOUR 1 PATCH TO SKIN

          TRANSDERMAL [id number]

          NOT-TAKING MORPHINE SULFATE ER 30 MG TABLET EXTENDED RELEASE 1-2

          AS DIRECTED ORALLY 1 IN AM,2 AT HS MDD3

          UNKNOWN MORPHINE SULFATE ER 15 MG TABLET EXTENDED RELEASE 1

          TABLET ORALLY 1 IN AM, 2 IN PM MDD3

          UNKNOWN AMITRIPTYLINE HCL 10 MG TABLET 3 TABLET ORALLY AT BEDTIME

          UNKNOWN VITAMIN D TABLET 500 UNITS ORALLY DAILY

          UNKNOWN METFORMIN 500 MG 1 TAB ORAL DAILY

          UNKNOWN HYDROCODONE-ACETAMINOPHEN 5-325 MG TABLET 1 TABLET AS

          NEEDED ORALLY EVERY 4 HRS

          UNKNOWN ACETAMINOPHEN 500 MG TABLET 2 TABLET AS NEEDED ORALLY AS

          NEEDED

          UNKNOWN AMLODIPINE 5MG TABLET ORAL DAILY

          UNKNOWN CHLORTHALIDONE 25 MG TABLET 1 TABLET IN THE MORNING

          ORALLY ONCE A DAY

          UNKNOWN LISINOPRIL 20 MG TABLET 1 TABLET ORALLY ONCE A DAY

          MEDICATION LIST REVIEWED AND RECONCILED WITH THE PATIENT

           

PAST MEDICAL HISTORY

           

          HEADACHE

          HTN

          GERD

          HIATAL HERNIA

          ANXIETY

          DEPRESSION

          CLAUSTROPHOBIA

          INTERSTITAL CYSTITIS

           

ALLERGIES

           

          PENICILLIN (FOR ALLERGIES USE ONLY): THROAT SWELL: ALLERGY

          CODEINE PHOSPHATE (FOR ALLERGIES USE ONLY): RASH: ALLERGY

          VANCOMYCIN HCL: RASH: ALLERGY

          COMPAZINE: HYPER: SIDE EFFECTS

          CYMBALTA: GO CRAZY: SIDE EFFECTS

          LYRICA: GO CRAZY

          SHELL FISH, IV DYE: THROAT SWELLS: ALLERGY

          ADHESIVE TAPE: BLISTERS: ALLERGY

           

SURGICAL HISTORY

           

          HYSTERECTOMY 1984

          APPY 1971

          AMADOR 1985

          CONE BX 1977

          MULTIPLE BLADDER DILATIONS

          3-4 LAPEROSCOPIES

          NERVE STIMULATOR IMPLANTED

          BOWEL SURGERY 2017

           

SOCIAL HISTORY

           

          GENERAL:

           

          TOBACCO USE

          ARE YOU A:NONSMOKER

           

           

          [employer]into

          [id number] [employer]

           

           

          LEARNING BARRIERS / SPECIAL NEEDS

          BARRIERS TO LEARNING?NO

          HEARING IMPAIRED?NO

          VISION IMPAIRED?YES

          :CORRECTIVE LENSES

          COGNITIVELY IMPAIRED?NO

          READINESS TO LEARN?YES

          LEARNING PREFERENCES?NO

          LEARNING CAPABILITIES PRESENT?YES

          EMOTIONAL BARRIERS?NO

          SPECIAL DEVICES?NO

           NEEDED?NO

           

           

          PAIN CLINIC PFS, CLERGY, PUBLIC HEALTH REFERRALS

          PFS REFERRAL NEEDED?NO

          CLERGY REFERRAL NEEDED?NO

          PUBLIC HEALTH REFERRAL NEEDED?NO

          WAS THE PROVIDER NOTIFIED OF ANY PERTINENT INFO?NO

          HAS THE PATIENT BEEN EDUCATED REGARDING HIS/HER PLAN OF CARE?YES

          HAS THE PATIENT BEEN EDUCATED REGARDING PAIN, THE RISK FOR PAIN,

          THE IMPORTANCE OF EFFECTIVE PAIN MANAGEMENT, AND THE PAIN

          ASSESSMENT PROCESS?YES

           

           

          PATIENT: ____.

           

           

          ADVANCE DIRECTIVES

          HEALTH CARE PROXY?NO

          WOULD YOU LIKE MORE INFORMATION?NO

          DO YOU HAVE A DNR?NO

          WOULD YOU LIKE MORE INFORMATION?NO

          LIVING WILL?NO

          WOULD YOU LIKE MORE INFORMATION?NO

          POWER OF ?NO

          WOULD YOU LIKE MORE INFORMATION?NO

           

HOSPITALIZATION/MAJOR DIAGNOSTIC PROCEDURE

           

          SURGERY RELATED

           

REVIEW OF SYSTEMS

           

      REVIEWED BY:

           

          PROVIDER:    MONICA CORBETT .

           

      CONSTITUTIONAL:

           

          ANY CHANGE IN YOUR MEDICAL CONDITION?    NO . CHILLS    NO .

          FEVER    NO .

           

      INFECTION:

           

          DO YOU HAVE NEW INFECTIONS?    NO . DO YOU HAVE HISTORY OF MRSA? 

            NO .

           

      MUSCULOSKELETAL:

           

          ANY NEW PATTERNS OF PAIN OR NUMBNESS?    NO .

           

      GASTROENTEROLOGY:

           

          ANY NEW CHANGE IN BOWEL CONTROL?    NO .

           

      GENITOURINARY:

           

          ANY NEW CHANGE IN BLADDER CONTROL?    NO . IS THERE A CHANCE YOU

          COULD BE PREGNANT?    NO .

           

      HEMATOLOGY/LYMPH:

           

          DO YOU TAKE ANY BLOOD THINNERS? (FOR EXAMPLE- COUMADIN, PLAVIX,

          AGGRENOX, PLATEL, PRADAXA, OR XARELTO)    NO . WHEN WAS YOUR LAST

          DOSE?    DATE: TIME:  .

           

      NEUROLOGY:

           

          HAVE YOU FALLEN IN THE PAST 6 MONTHS?    YES, PT SHE FELL

          9/15/17.  PT STATES SHE WAS TALKING ON PHONE AND COLLAPSED FOR

          UNKNOWN REASONS.  PT STATES SHE AWOKE AND INJURED RIGHT SHOULDER

          FRACTURED CLAVICLE.  PT STATES SHE WAS SEEN AND TX'D AT [facility].  PT STATES HER HEART RACES OCCASIONALLY, AND HAS AN UPCOMING

          APPT WITH CARDIOLOGIST, DR AKHTAR 17 . ANY NEW EXTREMITY

          NUMBNESS OR WEAKNESS?    NO .

           

      CARDIOLOGY:

           

          DO YOU HAVE A PACEMAKER OR DEFIBRILLATOR?    NO .

           

      RESPIRATORY:

           

          HAVE YOU BEEN SICK IN THE PAST WEEK?    NO . FEVER    NO . FLU

          LIKE SYMPTOMS?    NO . COUGH    NO .

           

      INTEGUMENTARY:

           

          DO YOU HAVE ANY RASHES OR OPEN SORES?    YES, OPEN SORES TO ABD

          S/P ABD SURGERY 2017 .

           

      ALLERGIC/IMMUNO:

           

          ARE YOU ALLERGIC TO SHELLFISH OR IV DYE?    YES, BOTH . ANY NEW

          ALLERGIES?    NO .

           

      PSYCHIATRIC:

           

          DO YOU HAVE THOUGHTS OF HURTING YOURSELF OR SOMEONE ELSE?    NO .

          ARE YOU ABUSED, NEGLECTED, OR IN AN UNSAFE ENVIRONMENT?    NO .

           

      ENDOCRINOLOGY:

           

          ARE YOU DIABETIC?    YES .

           

      OTHER:

           

          DO YOU NEED ANY PRESCRIPTIONS?    YES, MORPHINE IR . IF YES,

          PLEASE LIST:    ____ . ANY NEW PROBLEMS WITH YOUR MEDICATIONS?   

          NO . WHEN DID YOU LAST EAT?    ____ . WHEN DID YOU LAST DRINK?   

          ____ . WHAT DID YOU LAST DRINK?    ____ . NAME OF PERSON DRIVING

          YOU HOME?    ____ . DO YOU HAVE ANY OTHER QUESTIONS OR CONCERNS  

           NO, PT DENIES RECEIVING FLU VACCINE AND DOES NOT PLAN ON GETTING

          .

           

VITAL SIGNS

           

          .0 LBS, HT 64 IN, BMI 27.46 INDEX, /88 MM HG, HR 84

          /MIN, RR 16 /MIN, TEMP 96.0 F, OXYGEN SAT % 98%, NA INITIALS TL

          1100, REVIEWED BY: EM.

           

EXAMINATION

           

      GENERAL EXAMINATION:

          LUNGS:LUNG SOUNDS ARE CLEAR.

           

          HEART:HEART RATE REGULAR.

           

          ABDOMEN:TENDER TO PALPATION, DISTENDED.

           

          MUSCULOSKELETAL:*MUSCLE STRENGTH TESTING 5/5 BILATERAL, ,

          PALPATION: POSITIVE FOR PAIN OVER L/S SPINE. POSITIVE FOR PAIN

          OVER L/S PARSPINALS.

           

ASSESSMENTS

           

          INTERSTITIAL CYSTITIS - N30.10 (PRIMARY)

           

          CHRONIC PRESCRIPTION OPIATE USE - Z79.891

           

TREATMENT

           

      INTERSTITIAL CYSTITIS

          CONTINUE GABAPENTIN CAPSULE, 300 MG, 1 CAPSULE, ORALLY, THREE

          TIMES A DAY

          CONTINUE TIZANIDINE HCL TABLET, 4 MG, 1 TABLET AS NEEDED, ORALLY,

          FOUR TIMES DAILY PRN

          CONTINUE AMITRIPTYLINE HCL TABLET, 25 MG, 3 TABS, ORALLY, BEFORE

          BEDTIME

          REFILL TRAMADOL HCL TABLET, 50 MG, 1-2 TABS, ORALLY, Q6H PRN

          MDD8, 30 DAY(S), 240, REFILLS 0

          REFILL MORPHINE SULFATE TABLET, 15 MG, 1 TABLET AS NEEDED,

          ORALLY, Q8H PRN MDD3, 30 DAY(S), 90, REFILLS 0

           

PROCEDURE CODES

           

           ESTABILISHED PATIENT [facility] CHARGE

           

DISPOSITION & COMMUNICATION

           

FOLLOW UP

           

          2 MONTHS

           

 

ELECTRONICALLY SIGNED BY CHELLE RODRIGUEZ ON

          2017 AT 10:55 AM EST

           

           

           

 

DISCLAIMER :

THIS IS A VISIT SUMMARY EXTRACTED FROM THE [employer] CHART.

IT IS NOT A COPY OF THE VPEP PROGRESS NOTE.

DWAINE

## 2018-01-02 ENCOUNTER — HOSPITAL ENCOUNTER (OUTPATIENT)
Dept: HOSPITAL 53 - M PAIN | Age: 58
End: 2018-01-02
Attending: NURSE PRACTITIONER
Payer: COMMERCIAL

## 2018-01-02 DIAGNOSIS — Z79.891: ICD-10-CM

## 2018-01-02 DIAGNOSIS — N30.10: Primary | ICD-10-CM

## 2018-01-02 DIAGNOSIS — L23.1: ICD-10-CM

## 2018-01-02 DIAGNOSIS — F41.9: ICD-10-CM

## 2018-01-02 DIAGNOSIS — R05: ICD-10-CM

## 2018-01-02 DIAGNOSIS — S42.001G: ICD-10-CM

## 2018-01-02 DIAGNOSIS — Z91.013: ICD-10-CM

## 2018-01-02 DIAGNOSIS — K44.9: ICD-10-CM

## 2018-01-02 DIAGNOSIS — Z88.5: ICD-10-CM

## 2018-01-02 DIAGNOSIS — Z98.2: ICD-10-CM

## 2018-01-02 DIAGNOSIS — Z88.8: ICD-10-CM

## 2018-01-02 DIAGNOSIS — Z88.1: ICD-10-CM

## 2018-01-02 DIAGNOSIS — Z88.0: ICD-10-CM

## 2018-01-02 DIAGNOSIS — F40.240: ICD-10-CM

## 2018-01-02 DIAGNOSIS — I10: ICD-10-CM

## 2018-01-02 DIAGNOSIS — K21.9: ICD-10-CM

## 2018-01-02 DIAGNOSIS — F32.9: ICD-10-CM

## 2018-01-02 DIAGNOSIS — Z79.899: ICD-10-CM

## 2018-01-30 ENCOUNTER — HOSPITAL ENCOUNTER (OUTPATIENT)
Dept: HOSPITAL 53 - M PAIN | Age: 58
Discharge: HOME | End: 2018-01-30
Attending: NURSE PRACTITIONER
Payer: COMMERCIAL

## 2018-01-30 DIAGNOSIS — Z79.82: ICD-10-CM

## 2018-01-30 DIAGNOSIS — Z88.5: ICD-10-CM

## 2018-01-30 DIAGNOSIS — F41.9: ICD-10-CM

## 2018-01-30 DIAGNOSIS — Z88.0: ICD-10-CM

## 2018-01-30 DIAGNOSIS — Z79.899: ICD-10-CM

## 2018-01-30 DIAGNOSIS — Z91.013: ICD-10-CM

## 2018-01-30 DIAGNOSIS — K44.9: ICD-10-CM

## 2018-01-30 DIAGNOSIS — K21.9: ICD-10-CM

## 2018-01-30 DIAGNOSIS — I10: ICD-10-CM

## 2018-01-30 DIAGNOSIS — Z91.048: ICD-10-CM

## 2018-01-30 DIAGNOSIS — Z91.041: ICD-10-CM

## 2018-01-30 DIAGNOSIS — Z88.1: ICD-10-CM

## 2018-01-30 DIAGNOSIS — N30.10: ICD-10-CM

## 2018-01-30 DIAGNOSIS — G89.29: Primary | ICD-10-CM

## 2018-01-30 DIAGNOSIS — F33.9: ICD-10-CM

## 2018-06-01 ENCOUNTER — HOSPITAL ENCOUNTER (OUTPATIENT)
Dept: HOSPITAL 53 - M SDC | Age: 58
Discharge: HOME | End: 2018-06-01
Attending: SPECIALIST
Payer: COMMERCIAL

## 2018-06-01 DIAGNOSIS — R35.1: ICD-10-CM

## 2018-06-01 DIAGNOSIS — R10.2: ICD-10-CM

## 2018-06-01 DIAGNOSIS — I10: ICD-10-CM

## 2018-06-01 DIAGNOSIS — K21.9: ICD-10-CM

## 2018-06-01 DIAGNOSIS — E11.9: ICD-10-CM

## 2018-06-01 DIAGNOSIS — F32.9: ICD-10-CM

## 2018-06-01 DIAGNOSIS — J45.909: ICD-10-CM

## 2018-06-01 DIAGNOSIS — Z79.899: ICD-10-CM

## 2018-06-01 DIAGNOSIS — N30.10: Primary | ICD-10-CM

## 2018-06-01 DIAGNOSIS — Z79.82: ICD-10-CM

## 2018-06-01 LAB — GLUCOSE BLDC GLUCOMTR-MCNC: 166 MG/DL (ref 70–105)

## 2018-06-01 PROCEDURE — 52224 CYSTOSCOPY AND TREATMENT: CPT

## 2018-06-01 RX ADMIN — ATROPA BELLADONNA AND OPIUM 1 EA: 16.2; 6 SUPPOSITORY RECTAL at 13:08

## 2018-06-01 RX ADMIN — SODIUM CHLORIDE, POTASSIUM CHLORIDE, SODIUM LACTATE AND CALCIUM CHLORIDE 1 MLS/HR: 600; 310; 30; 20 INJECTION, SOLUTION INTRAVENOUS at 08:26

## 2018-06-01 RX ADMIN — LIDOCAINE HYDROCHLORIDE 1 ML: 10 INJECTION, SOLUTION INFILTRATION; PERINEURAL at 09:46

## 2018-06-01 RX ADMIN — LIDOCAINE HYDROCHLORIDE 1 DOSE: 20 JELLY TOPICAL at 09:11

## 2018-06-01 RX ADMIN — DEXTROSE MONOHYDRATE 1 MLS/HR: 50 INJECTION, SOLUTION INTRAVENOUS at 08:00

## 2018-06-01 RX ADMIN — ONABOTULINUMTOXINA 1 UNITS: 100 INJECTION, POWDER, LYOPHILIZED, FOR SOLUTION INTRADERMAL; INTRAMUSCULAR at 09:27

## 2018-06-01 RX ADMIN — ATROPA BELLADONNA AND OPIUM 1 EA: 16.2; 6 SUPPOSITORY RECTAL at 10:31

## 2018-07-05 ENCOUNTER — HOSPITAL ENCOUNTER (OUTPATIENT)
Dept: HOSPITAL 53 - M PAIN | Age: 58
End: 2018-07-05
Attending: NURSE PRACTITIONER
Payer: MEDICARE

## 2018-07-05 DIAGNOSIS — Z88.0: ICD-10-CM

## 2018-07-05 DIAGNOSIS — I10: ICD-10-CM

## 2018-07-05 DIAGNOSIS — Z79.84: ICD-10-CM

## 2018-07-05 DIAGNOSIS — Z88.8: ICD-10-CM

## 2018-07-05 DIAGNOSIS — F41.9: ICD-10-CM

## 2018-07-05 DIAGNOSIS — G89.29: ICD-10-CM

## 2018-07-05 DIAGNOSIS — Z79.891: ICD-10-CM

## 2018-07-05 DIAGNOSIS — N30.10: Primary | ICD-10-CM

## 2018-07-05 DIAGNOSIS — Z79.899: ICD-10-CM

## 2018-07-05 DIAGNOSIS — Z79.82: ICD-10-CM

## 2018-07-05 DIAGNOSIS — Z88.5: ICD-10-CM

## 2018-07-05 DIAGNOSIS — Z88.1: ICD-10-CM

## 2018-07-05 DIAGNOSIS — Z91.013: ICD-10-CM

## 2018-07-05 DIAGNOSIS — R10.2: ICD-10-CM

## 2018-07-05 DIAGNOSIS — E11.9: ICD-10-CM

## 2018-07-05 DIAGNOSIS — F40.240: ICD-10-CM

## 2018-07-05 DIAGNOSIS — K44.9: ICD-10-CM

## 2018-07-05 DIAGNOSIS — K21.9: ICD-10-CM

## 2018-07-05 DIAGNOSIS — Z91.09: ICD-10-CM

## 2018-07-05 DIAGNOSIS — F32.9: ICD-10-CM

## 2018-09-05 ENCOUNTER — HOSPITAL ENCOUNTER (OUTPATIENT)
Dept: HOSPITAL 53 - M PAIN | Age: 58
End: 2018-09-05
Attending: NURSE PRACTITIONER
Payer: MEDICARE

## 2018-09-05 DIAGNOSIS — N30.10: Primary | ICD-10-CM

## 2018-09-05 DIAGNOSIS — Z91.09: ICD-10-CM

## 2018-09-05 DIAGNOSIS — F41.9: ICD-10-CM

## 2018-09-05 DIAGNOSIS — I10: ICD-10-CM

## 2018-09-05 DIAGNOSIS — F40.240: ICD-10-CM

## 2018-09-05 DIAGNOSIS — Z79.84: ICD-10-CM

## 2018-09-05 DIAGNOSIS — Z91.013: ICD-10-CM

## 2018-09-05 DIAGNOSIS — F32.9: ICD-10-CM

## 2018-09-05 DIAGNOSIS — G89.29: ICD-10-CM

## 2018-09-05 DIAGNOSIS — K44.9: ICD-10-CM

## 2018-09-05 DIAGNOSIS — Z88.1: ICD-10-CM

## 2018-09-05 DIAGNOSIS — Z79.899: ICD-10-CM

## 2018-09-05 DIAGNOSIS — Z88.8: ICD-10-CM

## 2018-09-05 DIAGNOSIS — Z88.5: ICD-10-CM

## 2018-09-05 DIAGNOSIS — Z79.891: ICD-10-CM

## 2018-09-05 DIAGNOSIS — Z79.82: ICD-10-CM

## 2018-09-05 DIAGNOSIS — K21.9: ICD-10-CM

## 2018-09-05 DIAGNOSIS — Z88.0: ICD-10-CM

## 2018-11-05 ENCOUNTER — HOSPITAL ENCOUNTER (OUTPATIENT)
Dept: HOSPITAL 53 - M PAIN | Age: 58
End: 2018-11-05
Attending: NURSE PRACTITIONER
Payer: MEDICARE

## 2018-11-05 DIAGNOSIS — Z88.5: ICD-10-CM

## 2018-11-05 DIAGNOSIS — Z79.891: ICD-10-CM

## 2018-11-05 DIAGNOSIS — K44.9: ICD-10-CM

## 2018-11-05 DIAGNOSIS — F41.9: ICD-10-CM

## 2018-11-05 DIAGNOSIS — I10: ICD-10-CM

## 2018-11-05 DIAGNOSIS — Z88.0: ICD-10-CM

## 2018-11-05 DIAGNOSIS — Z88.8: ICD-10-CM

## 2018-11-05 DIAGNOSIS — K21.9: ICD-10-CM

## 2018-11-05 DIAGNOSIS — Z79.899: ICD-10-CM

## 2018-11-05 DIAGNOSIS — Z79.82: ICD-10-CM

## 2018-11-05 DIAGNOSIS — Z88.1: ICD-10-CM

## 2018-11-05 DIAGNOSIS — Z91.09: ICD-10-CM

## 2018-11-05 DIAGNOSIS — Z79.84: ICD-10-CM

## 2018-11-05 DIAGNOSIS — N30.10: Primary | ICD-10-CM

## 2018-11-05 DIAGNOSIS — F40.240: ICD-10-CM

## 2018-11-05 DIAGNOSIS — Z91.013: ICD-10-CM

## 2018-11-05 DIAGNOSIS — F32.9: ICD-10-CM

## 2019-06-26 ENCOUNTER — HOSPITAL ENCOUNTER (OUTPATIENT)
Dept: HOSPITAL 53 - M PAIN | Age: 59
End: 2019-06-26
Attending: NURSE PRACTITIONER
Payer: MEDICARE

## 2019-06-26 DIAGNOSIS — F41.9: ICD-10-CM

## 2019-06-26 DIAGNOSIS — F40.240: ICD-10-CM

## 2019-06-26 DIAGNOSIS — R10.84: ICD-10-CM

## 2019-06-26 DIAGNOSIS — G89.29: Primary | ICD-10-CM

## 2019-06-26 DIAGNOSIS — Z91.048: ICD-10-CM

## 2019-06-26 DIAGNOSIS — Z88.0: ICD-10-CM

## 2019-06-26 DIAGNOSIS — Z88.5: ICD-10-CM

## 2019-06-26 DIAGNOSIS — K21.9: ICD-10-CM

## 2019-06-26 DIAGNOSIS — Z91.013: ICD-10-CM

## 2019-06-26 DIAGNOSIS — Z91.040: ICD-10-CM

## 2019-06-26 DIAGNOSIS — Z79.84: ICD-10-CM

## 2019-06-26 DIAGNOSIS — Z79.899: ICD-10-CM

## 2019-06-26 DIAGNOSIS — N30.10: ICD-10-CM

## 2019-06-26 DIAGNOSIS — F32.9: ICD-10-CM

## 2019-06-26 DIAGNOSIS — Z79.82: ICD-10-CM

## 2019-06-26 DIAGNOSIS — K44.9: ICD-10-CM

## 2019-06-26 DIAGNOSIS — Z79.891: ICD-10-CM

## 2019-06-26 DIAGNOSIS — I10: ICD-10-CM

## 2019-06-26 DIAGNOSIS — Z88.8: ICD-10-CM

## 2019-07-16 NOTE — ECWPNPC
PATIENT NAME: JORDANA MILLER

: 1960

GENDER: FEMALE

MRN: [id number]

VISIT DATE: 2019

DISCHARGE DATE: 19 1502

VISIT LOCKED DATE TIME: 

PHYSICIAN: MONICA VILLAREAL

RESOURCE: MONICA VILLAREAL

 

           

           

REASON FOR APPOINTMENT

           

          1. PELVIS/BLADDER

           

HISTORY OF PRESENT ILLNESS

           

      HISTORY OF PRESENT ILLNESS:  HERE FOR F/U OF

          CHRONIC PELVIC PAIN.CHIEF AREA OF PAIN IS PELVIC AND LOWER

          ABDOMINAL AREA.STATES MEDICATION IS SOMEWHAT HELPFUL AT REDUCING

          PAIN AND KEEPING HER FUNCTIONAL.HAS BEEN HAVING A SEVERE INCREASE

          IN ABDOMINAL PAIN RELATED TO CHRONIC INTERSTITIAL CYSTITIS.STAES

          SHE IS HAVING EPISODES OF SEVERE DISABLING PAIN AND SHE IS NOT

          ABLE TO GET UP.ADMITS TO TAKING AN EXTRA MORPHINE AT

          TIMES.DISCUSSED MEDICATION OPTIONS.DENIES SIDE EFFECTS.HAS DCS

          FOR BLADDER PAIN THAT SHE IS NOT USING THIS WAS PUT IN SEVERAL

          YEARS AGO AND REVISED IN . RATING PAIN VAS 8/10.

      PAIN

           

           

          THE PATIENT DESCRIBES THE PAIN...

           

      FALL RISK SCREENING:

      SCREENING

           

           

          :NO FALLS REPORTED IN THE LAST YEAR

           

CURRENT MEDICATIONS

           

          TAKING METOPROLOL TARTRATE 100 MG TABLET ORALLY BID

          TAKING LISINOPRIL 20 MG TABLET 1 TABLET ORALLY ONCE A DAY

          TAKING OMEPRAZOLE 20 MG ORALLY ONCE A DAY

          TAKING ALBUTEROL SULFATE  (90 BASE) MCG/ACT AEROSOL

          SOLUTION 2 PUFFS AS NEEDED INHALATION EVERY 4 HRS

          TAKING ASPIRIN ADULT LOW STRENGTH 81 MG 1 TABLET ORALLY ONCE A

          DAY

          TAKING MULTIVITAMINS TABLET 1 TAB ORALLY DAILY

          TAKING NITROGLYCERIN 0.4 MG SUBLINGUAL AS NEEDED

          TAKING VITAMIN D TABLET 500 UNITS ORALLY DAILY

          TAKING EFFEXOR 25 MG TABLET 1 TABLET WITH FOOD ORALLY BID

          TAKING METFORMIN 500 MG 1 TAB ORAL TWICE A DAY

          TAKING TIZANIDINE HCL 4 MG TABLET 1 TABLET AS NEEDED ORALLY Q8H

          PRN

          TAKING AMITRIPTYLINE HCL 25 MG TABLET 3 TABS ORALLY BEFORE

          BEDTIME

          TAKING MORPHINE SULFATE 15 MG TABLET 1 TABLET AS NEEDED ORALLY

          Q6H MDD2

          TAKING TRAMADOL HCL 50 MG TABLET 1-2 TABS ORALLY Q6H PRN MDD6

          TAKING GABAPENTIN 600 MG TABLET 1 TABLET ORALLY THREE TIMES A DAY

          TAKING MACROBID 100 MG CAPSULE 1 CAPSULE WITH FOOD ORALLY EVERY

          12 HRS

          TAKING SULFAMETHOXAZOLE-TRIMETHOPRIM 400-80 MG TABLET 1 TABLET

          ORALLY ONCE A DAY, NOTES: UNSURE OF DOSE

          NOT-TAKING PREMARIN 0.3 MG TABLET 1 TABLET ORALLY ONCE A DAY (PT

          UNSURE OF DOSE)

          NOT-TAKING BACTRIM -160 MG TABLET 1 TABLET ORALLY TWICE A

          DAY

          NOT-TAKING AMITRIPTYLINE HCL 10 MG TABLET 3 TABLET ORALLY AT

          BEDTIME

          NOT-TAKING ACETAMINOPHEN 500 MG TABLET 2 TABLET AS NEEDED ORALLY

          AS NEEDED

          NOT-TAKING GABAPENTIN 300 MG CAPSULE 1 CAPSULE ORALLY THREE TIMES

          A DAY

          NOT-TAKING FENTANYL 12 MCG/HR PATCH 72 HOUR 1 PATCH TO SKIN

          TRANSDERMAL [id number]=VVA

          NOT-TAKING MORPHINE SULFATE ER 30 MG TABLET EXTENDED RELEASE 1-2

          AS DIRECTED ORALLY 1 IN AM,2 AT HS MDD3

          NOT-TAKING MORPHINE SULFATE ER 15 MG TABLET EXTENDED RELEASE 1

          TABLET ORALLY 1 IN AM, 2 IN PM MDD3

          NOT-TAKING HYDROCODONE-ACETAMINOPHEN 5-325 MG TABLET 1 TABLET AS

          NEEDED ORALLY EVERY 4 HRS

          NOT-TAKING AMLODIPINE 5MG TABLET ORAL DAILY

          NOT-TAKING CHLORTHALIDONE 25 MG TABLET 1 TABLET IN THE MORNING

          ORALLY ONCE A DAY

          MEDICATION LIST REVIEWED AND RECONCILED WITH THE PATIENT

           

PAST MEDICAL HISTORY

           

          HEADACHE

          HTN

          GERD

          HIATAL HERNIA

          ANXIETY

          DEPRESSION

          CLAUSTROPHOBIA

          INTERSTITAL CYSTITIS

          RIGHT COLLAR BONE FX

          BENIGN MASS IN RIGHT GROIN

          CHRONIC PAIN

           

ALLERGIES

           

          PENICILLIN (FOR ALLERGIES USE ONLY): THROAT SWELL - ALLERGY

          CODEINE PHOSPHATE (FOR ALLERGIES USE ONLY): RASH - ALLERGY

          VANCOMYCIN HCL: RASH - ALLERGY

          COMPAZINE: HYPER - SIDE EFFECTS

          CYMBALTA: GO CRAZY - SIDE EFFECTS

          LYRICA: GO CRAZY

          SHELL FISH, IV DYE: THROAT SWELLS - ALLERGY

          ADHESIVE TAPE: BLISTERS - ALLERGY

          LATEX (FOR ALLERGY USE ONLY): HIVES - ALLERGY

           

SURGICAL HISTORY

           

          HYSTERECTOMY 1984

          APPY 1971

          AMADOR 1985

          CONE BX 1977

          MULTIPLE BLADDER DILATIONS

          3-4 LAPEROSCOPIES

          NERVE STIMULATOR IMPLANTED

          BOWEL SURGERY 2017

          BOTOX INJECTION INTO BLADDER 2018

           

FAMILY HISTORY

           

          FATHER: , DIAGNOSED WITH HEART DISEASE

          MOTHER: , CANCER

          SIBLINGS: 

          1 SISTER(S) . 2 SON(S) , 1 DAUGHTER(S) - HEALTHY.

          SISTER HAD HEART ISSUES, CANCER, OLDEST SON HAS STOMACH ISSUES.

           

SOCIAL HISTORY

           

          GENERAL:

           

          TOBACCO USE

          ARE YOU A:NONSMOKER

           

           

          EDUCATION

          LEVEL OF EDUCATION:COLLEGE

           

           

          LANGUAGE

          LANGUAGES SPOKEN:ENGLISH

           

           

          DOMESTIC VIOLENCE

          DO YOU FEEL SAFE IN YOUR ENVIRONMENT?YES

           

           

          RECREATIONAL DRUG USE

          DRUG USE?NO

           

           

          LEARNING BARRIERS / SPECIAL NEEDS

          BARRIERS TO LEARNING?NO

          HEARING IMPAIRED?NO

          VISION IMPAIRED?YES

          :CORRECTIVE LENSES

          COGNITIVELY IMPAIRED?NO

          READINESS TO LEARN?YES

          LEARNING PREFERENCES?NO

          LEARNING CAPABILITIES PRESENT?YES

          EMOTIONAL BARRIERS?NO

          SPECIAL DEVICES?NO

           NEEDED?NO

           

           

          PAIN CLINIC PFS, CLERGY, PUBLIC HEALTH REFERRALS

          PFS REFERRAL NEEDED?NO

          CLERGY REFERRAL NEEDED?NO

          PUBLIC HEALTH REFERRAL NEEDED?NO

          WAS THE PROVIDER NOTIFIED OF ANY PERTINENT INFO?NO

          HAS THE PATIENT BEEN EDUCATED REGARDING HIS/HER PLAN OF CARE?YES

          HAS THE PATIENT BEEN EDUCATED REGARDING PAIN, THE RISK FOR PAIN,

          THE IMPORTANCE OF EFFECTIVE PAIN MANAGEMENT, AND THE PAIN

          ASSESSMENT PROCESS?YES

           

           

          LATEX QUESTIONNAIRE

          LATEX ALLERGY : HAVE YOU EVER DEVELOPED ANY TYPE OF REACTION

          AFTER HANDLING LATEX PRODUCTS SUCH AS RUBBER GLOVES, CONDOMS,

          DIAPHRAGMS, BALLOONS, SOCKS, OR UNDERWEAR?YES

          - PLEASE INDICATE :RUBBER GLOVES, OTHER (DOCUMENT IN NOTES)

          LATEX ALLERGY : HAVE YOU EVER DEVELOPED ANY TYPE OF REACTION

          DURING OR AFTER DENTAL APPOINTMENT, VAGINAL/RECTAL EXAMINATION,

          SURGICAL PROCEDURE, OR ANY OTHER EXPOSURE?NO

          LATEX RISK : HAVE YOU EVER HAD ANY DIFFICULTY BREATHING OR HIVES

          AFTER EATING OR HANDLING ANY FRUITS, OR VEGETABLES; SUCH AS KIWI,

          BANANAS, STONE FRUITS, OR CHESTNUTSNO

          LATEX RISK : DO YOU HAVE A PREVIOUS PERSONAL HISTORY OF MORE THAN

          NINE SURGERIES, SPINA BIFIDA, OR REPEATED CATHERTIZATIONS? YES

          - PLEASE INDICATE : > 9 SURGERIES

          LATEX RISK : ARE YOU FREQUENTLY EXPOSED TO LATEX PRODUCTS IN YOUR

          OCCUPATION?NO

          DATE ASKED : 2019 LATEX ALLERGY

           

           

          CAFFEINE

          CAFFEINE USE?YES

          HOW OFTEN AND HOW MUCH? 4 PEPSI'S PER DAY

           

           

          ADVANCE DIRECTIVE

          ADVANCE DIRECTIVE DISCUSSED WITH PATIENT:YES PATIENT DECLINES HCP

          INFORMATION AT THIS TIME.

           

           

          [facility]

          [id number]

           

           

          ALCOHOL SCREENING

          DID YOU HAVE A DRINK CONTAINING ALCOHOL IN THE PAST YEAR?NO

          POINTS0

          INTERPRETATIONNEGATIVE

           

           

          SEXUAL HX

          HAD SEX IN THE LAST 12 MONTHS (VAGINAL, ORAL, OR ANAL)?YES

          WITHMEN ONLY

          USE PROTECTION?NO

          HAVE YOU EVER HAD AN STD?NO

           

           

          REVIEWED WITH PATIENT 18 1309 JSREVIEWED WITH PATIENT

          19 1425 JS.

           

HOSPITALIZATION/MAJOR DIAGNOSTIC PROCEDURE

           

          SURGERY RELATED

          CHEST PAIN

          CHILD BIRTH

           

REVIEW OF SYSTEMS

           

      REVIEWED BY:

           

          PROVIDER:    MONICA CORBETT .

           

      CONSTITUTIONAL:

           

          ANY CHANGE IN YOUR MEDICAL CONDITION?    NO . CHILLS    NO .

          FEVER    NO .

           

      INFECTION:

           

          DO YOU HAVE NEW INFECTIONS?    YES, BLADDER INFECTION, CURRENTLY

          ON ANTIBIOTICS . DO YOU HAVE HISTORY OF MRSA?    NO .

           

      MUSCULOSKELETAL:

           

          ANY NEW PATTERNS OF PAIN OR NUMBNESS?    YES, STATES INCREASED

          PAIN FOR THE PAST MONTH .

           

      GASTROENTEROLOGY:

           

          ANY NEW CHANGE IN BOWEL CONTROL?    NO .

           

      GENITOURINARY:

           

          ANY NEW CHANGE IN BLADDER CONTROL?    NO . IS THERE A CHANCE YOU

          COULD BE PREGNANT?    NO .

           

      HEMATOLOGY/LYMPH:

           

          DO YOU TAKE ANY BLOOD THINNERS? (FOR EXAMPLE- COUMADIN, PLAVIX,

          AGGRENOX, PLATEL, PRADAXA, OR XARELTO)    NO . WHEN WAS YOUR LAST

          DOSE?    DATE: TIME:  .

           

      NEUROLOGY:

           

          HAVE YOU FALLEN IN THE PAST 12 MONTHS?    NO . ANY NEW EXTREMITY

          NUMBNESS OR WEAKNESS?    NO .

           

      CARDIOLOGY:

           

          DO YOU HAVE A PACEMAKER OR DEFIBRILLATOR?    NO .

           

      RESPIRATORY:

           

          HAVE YOU BEEN SICK IN THE PAST WEEK?    YES, BLADDER INFECTION .

          FEVER    NO . FLU LIKE SYMPTOMS?    NO . COUGH    NO .

           

      INTEGUMENTARY:

           

          DO YOU HAVE ANY RASHES OR OPEN SORES?    NO .

           

      ALLERGIC/IMMUNO:

           

          ARE YOU ALLERGIC TO IV DYE?    YES . ANY NEW ALLERGIES?    NO .

           

      PSYCHIATRIC:

           

          DO YOU HAVE THOUGHTS OF HURTING YOURSELF OR SOMEONE ELSE?    NO .

          ARE YOU ABUSED, NEGLECTED, OR IN AN UNSAFE ENVIRONMENT?    NO .

           

      ENDOCRINOLOGY:

           

          ARE YOU DIABETIC?    YES .

           

      OTHER:

           

          DO YOU NEED ANY PRESCRIPTIONS?    YES . IF YES, PLEASE LIST:   

          ____TRAMADOL, GABAPENTIN, MORPHINE . ANY NEW PROBLEMS WITH YOUR

          MEDICATIONS?    NO . WHEN DID YOU LAST EAT?    ____ . WHEN DID

          YOU LAST DRINK?    ____ . WHAT DID YOU LAST DRINK?    ____ . NAME

          OF PERSON DRIVING YOU HOME?    ____ . DO YOU HAVE ANY OTHER

          QUESTIONS OR CONCERNS    NO .

           

VITAL SIGNS

           

          .2 LBS, HT 64 IN, BMI 28.35 INDEX, /98 MM HG, 

          MANUAL, RR 16 /MIN, TEMP 96.3 F, OXYGEN SAT % 97%, SAFE IN ENV?

          (Y/N) YES, NA INITIALS SC 14:02, REVIEWED BY: [email or web address] DISCUSSED

          ELEVATED HR WITH PATIENT, 125 BPM ON MACHINE UPON ARRIVAL TO THE

          CLINIC. TAKEN MANUALLY AFTER PATIENT HAD BEEN HERE 15 MINUTES,

          105 BPM. PATIENT STATED IT WAS ELEVATED WHEN BEING SEEN BY HER

          PCP RECENTLY - PCP IS GONIG TO HAVE HER WEAR A MONITOR ONCE IT IS

          ORDERED. 1429 JS.

           

EXAMINATION

           

      GENERAL EXAMINATION:

          LUNGS:LUNG SOUNDS ARE CLEAR.

           

          HEART:HEART RATE REGULAR.

           

          ABDOMEN:TENDER TO PALPATION, DISTENDED.

           

          MUSCULOSKELETAL:*MUSCLE STRENGTH TESTING 5/5 BILATERAL, ,

          PALPATION: POSITIVE FOR PAIN OVER L/S SPINE. POSITIVE FOR PAIN

          OVER L/S PARSPINALS.

           

ASSESSMENTS

           

          INTERSTITIAL CYSTITIS - N30.10 (PRIMARY)

           

          CHRONIC PRESCRIPTION OPIATE USE - Z79.891

           

          GENERALIZED ABDOMINAL PAIN - R10.84

           

TREATMENT

           

      INTERSTITIAL CYSTITIS

          DECREASE TRAMADOL HCL TABLET, 50 MG, 1 TABLET AS NEEDED, ORALLY,

          Q6H PRN MDD4, 30 DAYS, 120, REFILLS 2

          REFILL AMITRIPTYLINE HCL TABLET, 25 MG, 3 TABS, ORALLY, BEFORE

          BEDTIME, 30 DAY(S), 90, REFILLS 2

          INCREASE MORPHINE SULFATE TABLET, 15 MG, 1 TABLET AS NEEDED,

          ORALLY, Q6H PRN MDD4, 30 DAY(S), 120, REFILLS 0

          REFILL GABAPENTIN TABLET, 600 MG, 1 TABLET, ORALLY, THREE TIMES A

          DAY, 30 DAYS, 90 TABLET, REFILLS 0

          NOTES: ISTOP REGISTRY REVIEWED AND DEMONSTRATES COMPLLIANCE. (REF

          # ) BRINGS IN MEDICATIONS WHICH IS APPROPRIATE FOR WHAT WAS

          DISPENSED. RECENT URINE TOXICOLOGY REVIEWED. NO UNAUTHORIZED

          MEDICATIONS. NO ILLICIT SUBSTANCES AND PRESCRIBED MEDICATIONS

          WERE PRESENT. URINE TOX TODAY, RISKS AND BENEFITS OF

          NARCOTIC/OPIOD MEDICATIONS WERE REVIEWED WITH PATIENT - THIS

          INCLUDES BUT IS NOT LIMITED TO RISK OF DEPENDANCE/DEVELOPMENT OF

          ADDICTION, MOOD DISTURBANCE AND DEPRESSION, OSTEOPOROSIS,

          HORMONAL AND LABIDAL CHANGES, RESPIRATORY DEPRESSION AND DEATH.

          PATIENT IS ADVISED NOT TO DRIVE OR DRINK ALCOHOL WHILE ON THESE

          MEDICATIONS.

           

PROCEDURE CODES

           

           ESTABILISHED PATIENT [facility] CHARGE

           

DISPOSITION & COMMUNICATION

           

FOLLOW UP

           

          2 MONTHS (REASON: MED MGMT)

           

 

ELECTRONICALLY SIGNED BY CHELLE GARCIA ON

          07/15/2019 AT 09:04 AM EDT

           

           

           

 

DISCLAIMER :

THIS IS A VISIT SUMMARY EXTRACTED FROM THE [employer] CHART.

IT IS NOT A COPY OF THE [employer] PROGRESS NOTE.

MTDD

## 2019-09-10 ENCOUNTER — HOSPITAL ENCOUNTER (OUTPATIENT)
Dept: HOSPITAL 53 - M PAIN | Age: 59
End: 2019-09-10
Attending: NURSE PRACTITIONER
Payer: MEDICARE

## 2019-09-10 DIAGNOSIS — E11.9: ICD-10-CM

## 2019-09-10 DIAGNOSIS — Z79.82: ICD-10-CM

## 2019-09-10 DIAGNOSIS — G89.29: ICD-10-CM

## 2019-09-10 DIAGNOSIS — Z79.899: ICD-10-CM

## 2019-09-10 DIAGNOSIS — Z79.84: ICD-10-CM

## 2019-09-10 DIAGNOSIS — Z88.1: ICD-10-CM

## 2019-09-10 DIAGNOSIS — Z91.041: ICD-10-CM

## 2019-09-10 DIAGNOSIS — Z91.013: ICD-10-CM

## 2019-09-10 DIAGNOSIS — Z88.0: ICD-10-CM

## 2019-09-10 DIAGNOSIS — Z88.8: ICD-10-CM

## 2019-09-10 DIAGNOSIS — Z91.09: ICD-10-CM

## 2019-09-10 DIAGNOSIS — Z91.040: ICD-10-CM

## 2019-09-10 DIAGNOSIS — Z86.59: ICD-10-CM

## 2019-09-10 DIAGNOSIS — K21.9: ICD-10-CM

## 2019-09-10 DIAGNOSIS — Z88.5: ICD-10-CM

## 2019-09-10 DIAGNOSIS — I10: ICD-10-CM

## 2019-09-10 DIAGNOSIS — N30.10: Primary | ICD-10-CM

## 2019-09-24 NOTE — ECWPNPC
PATIENT NAME: JORDANA MILLER

: 1960

GENDER: FEMALE

MRN: [id number]

VISIT DATE: 09/10/2019

DISCHARGE DATE: 09/10/19 1358

VISIT LOCKED DATE TIME: 

PHYSICIAN: MONICA VILLAREAL

RESOURCE: MONICA VILLARELA

 

           

           

REASON FOR APPOINTMENT

           

          1. PELVIS/BLADDER

           

HISTORY OF PRESENT ILLNESS

           

      HISTORY OF PRESENT ILLNESS:  HERE FOR F/U OF

          CHRONIC PELVIC PAIN.CHIEF AREA OF PAIN IS PELVIC AND LOWER

          ABDOMINAL AREA.STATES MEDICATION IS SOMEWHAT HELPFUL AT REDUCING

          PAIN AND KEEPING HER FUNCTIONAL.HAS BEEN HAVING A SEVERE INCREASE

          IN ABDOMINAL PAIN RELATED TO CHRONIC INTERSTITIAL CYSTITIS.STATES

          SHE IS HAVING EPISODES OF SEVERE DISABLING PAIN AND SHE IS NOT

          ABLE TO GET UP.HAS DCS FOR BLADDER PAIN THAT SHE IS NOT USING

          THIS WAS PUT IN SEVERAL YEARS AGO AND REVISED IN . RATING

          PAIN VAS 8/10.HAVING DIFFICULTY WITH URINARY RETENTION AND IS

          FOLLOWING WITH UROLOGY IN [place].

      PAIN

           

           

          THE PATIENT DESCRIBES THE PAIN...

           

      FALL RISK SCREENING:

      SCREENING

           

           

          :NO FALLS REPORTED IN THE LAST YEAR

           

CURRENT MEDICATIONS

           

          TAKING METOPROLOL TARTRATE 100 MG TABLET ORALLY BID

          TAKING LISINOPRIL 20 MG TABLET 1 TABLET ORALLY ONCE A DAY

          TAKING OMEPRAZOLE 20 MG ORALLY ONCE A DAY

          TAKING ALBUTEROL SULFATE  (90 BASE) MCG/ACT AEROSOL

          SOLUTION 2 PUFFS AS NEEDED INHALATION EVERY 4 HRS

          TAKING ASPIRIN ADULT LOW STRENGTH 81 MG 1 TABLET ORALLY ONCE A

          DAY

          TAKING MULTIVITAMINS TABLET 1 TAB ORALLY DAILY

          TAKING NITROGLYCERIN 0.4 MG SUBLINGUAL AS NEEDED

          TAKING VITAMIN D TABLET 500 UNITS ORALLY DAILY

          TAKING EFFEXOR 25 MG TABLET 1 TABLET WITH FOOD ORALLY BID

          TAKING METFORMIN 500 MG 1 TAB ORAL TWICE A DAY

          TAKING TIZANIDINE HCL 4 MG TABLET 1 TABLET AS NEEDED ORALLY Q8H

          PRN

          TAKING MACROBID 100 MG CAPSULE 1 CAPSULE WITH FOOD ORALLY EVERY

          12 HRS

          TAKING SULFAMETHOXAZOLE-TRIMETHOPRIM 400-80 MG TABLET 1 TABLET

          ORALLY ONCE A DAY, NOTES: UNSURE OF DOSE

          TAKING TRAMADOL HCL 50 MG TABLET 1 TABLET AS NEEDED ORALLY Q6H

          PRN MDD4

          TAKING GABAPENTIN 600 MG TABLET 1 TABLET ORALLY THREE TIMES A DAY

          TAKING AMITRIPTYLINE HCL 25 MG TABLET 3 TABS ORALLY BEFORE

          BEDTIME

          TAKING MORPHINE SULFATE 15 MG TABLET 1 TABLET AS NEEDED ORALLY

          Q6H PRN MDD4

          NOT-TAKING PREMARIN 0.3 MG TABLET 1 TABLET ORALLY ONCE A DAY (PT

          UNSURE OF DOSE)

          NOT-TAKING BACTRIM -160 MG TABLET 1 TABLET ORALLY TWICE A

          DAY

          NOT-TAKING AMITRIPTYLINE HCL 10 MG TABLET 3 TABLET ORALLY AT

          BEDTIME

          NOT-TAKING ACETAMINOPHEN 500 MG TABLET 2 TABLET AS NEEDED ORALLY

          AS NEEDED

          NOT-TAKING GABAPENTIN 300 MG CAPSULE 1 CAPSULE ORALLY THREE TIMES

          A DAY

          NOT-TAKING FENTANYL 12 MCG/HR PATCH 72 HOUR 1 PATCH TO SKIN

          TRANSDERMAL [id number]

          NOT-TAKING MORPHINE SULFATE ER 30 MG TABLET EXTENDED RELEASE 1-2

          AS DIRECTED ORALLY 1 IN AM,2 AT HS MDD3

          NOT-TAKING MORPHINE SULFATE ER 15 MG TABLET EXTENDED RELEASE 1

          TABLET ORALLY 1 IN AM, 2 IN PM MDD3

          NOT-TAKING HYDROCODONE-ACETAMINOPHEN 5-325 MG TABLET 1 TABLET AS

          NEEDED ORALLY EVERY 4 HRS

          NOT-TAKING AMLODIPINE 5MG TABLET ORAL DAILY

          NOT-TAKING CHLORTHALIDONE 25 MG TABLET 1 TABLET IN THE MORNING

          ORALLY ONCE A DAY

          MEDICATION LIST REVIEWED AND RECONCILED WITH THE PATIENT

           

PAST MEDICAL HISTORY

           

          HEADACHE

          HTN

          GERD

          HIATAL HERNIA

          ANXIETY

          DEPRESSION

          CLAUSTROPHOBIA

          INTERSTITAL CYSTITIS

          RIGHT COLLAR BONE FX

          BENIGN MASS IN RIGHT GROIN

          CHRONIC PAIN

           

ALLERGIES

           

          PENICILLIN (FOR ALLERGIES USE ONLY): THROAT SWELL - ALLERGY

          CODEINE PHOSPHATE (FOR ALLERGIES USE ONLY): RASH - ALLERGY

          VANCOMYCIN HCL: RASH - ALLERGY

          COMPAZINE: HYPER - SIDE EFFECTS

          CYMBALTA: GO CRAZY - SIDE EFFECTS

          LYRICA: GO CRAZY

          SHELL FISH, IV DYE: THROAT SWELLS - ALLERGY

          ADHESIVE TAPE: BLISTERS - ALLERGY

          LATEX (FOR ALLERGY USE ONLY): HIVES - ALLERGY

           

SURGICAL HISTORY

           

          HYSTERECTOMY 1984

          APPY 1971

          AMADOR 1985

          CONE BX 1977

          MULTIPLE BLADDER DILATIONS

          3-4 LAPEROSCOPIES

          NERVE STIMULATOR IMPLANTED

          BOWEL SURGERY 2017

          BOTOX INJECTION INTO BLADDER 2018

           

FAMILY HISTORY

           

          FATHER: , DIAGNOSED WITH UNSPECIFIED HEART DISEASE

          MOTHER: , OTHER MALIGNANT NEOPLASM OF UNSPECIFIED SITE

          SIBLINGS: 

          1 SISTER(S) . 2 SON(S) , 1 DAUGHTER(S) - HEALTHY.

          SISTER HAD HEART ISSUES, CANCER, OLDEST SON HAS STOMACH ISSUES.

           

SOCIAL HISTORY

           

          GENERAL:

           

          TOBACCO USE

          ARE YOU A:NONSMOKER

           

           

          EDUCATION

          LEVEL OF EDUCATION:COLLEGE

           

           

          LANGUAGE

          LANGUAGES SPOKEN:ENGLISH

           

           

          DOMESTIC VIOLENCE

          DO YOU FEEL SAFE IN YOUR ENVIRONMENT?YES

           

           

          RECREATIONAL DRUG USE

          DRUG USE?NO

           

           

          LEARNING BARRIERS / SPECIAL NEEDS

          BARRIERS TO LEARNING?NO

          HEARING IMPAIRED?NO

          VISION IMPAIRED?YES

          COGNITIVELY IMPAIRED?NO

          :CORRECTIVE LENSES

          READINESS TO LEARN?YES

          LEARNING PREFERENCES?NO

          LEARNING CAPABILITIES PRESENT?YES

          EMOTIONAL BARRIERS?NO

          SPECIAL DEVICES?NO

           NEEDED?NO

           

           

          PAIN CLINIC PFS, CLERGY, PUBLIC HEALTH REFERRALS

          PFS REFERRAL NEEDED?NO

          CLERGY REFERRAL NEEDED?NO

          PUBLIC HEALTH REFERRAL NEEDED?NO

          WAS THE PROVIDER NOTIFIED OF ANY PERTINENT INFO?YES

          HAS THE PATIENT BEEN EDUCATED REGARDING HIS/HER PLAN OF CARE?YES

          HAS THE PATIENT BEEN EDUCATED REGARDING PAIN, THE RISK FOR PAIN,

          THE IMPORTANCE OF EFFECTIVE PAIN MANAGEMENT, AND THE PAIN

          ASSESSMENT PROCESS?YES

           

           

          LATEX QUESTIONNAIRE

          LATEX ALLERGY : HAVE YOU EVER DEVELOPED ANY TYPE OF REACTION

          AFTER HANDLING LATEX PRODUCTS SUCH AS RUBBER GLOVES, CONDOMS,

          DIAPHRAGMS, BALLOONS, SOCKS, OR UNDERWEAR?YES

          - PLEASE INDICATE :RUBBER GLOVES, OTHER (DOCUMENT IN NOTES)

          LATEX ALLERGY : HAVE YOU EVER DEVELOPED ANY TYPE OF REACTION

          DURING OR AFTER DENTAL APPOINTMENT, VAGINAL/RECTAL EXAMINATION,

          SURGICAL PROCEDURE, OR ANY OTHER EXPOSURE?NO

          LATEX RISK : HAVE YOU EVER HAD ANY DIFFICULTY BREATHING OR HIVES

          AFTER EATING OR HANDLING ANY FRUITS, OR VEGETABLES; SUCH AS KIWI,

          BANANAS, STONE FRUITS, OR CHESTNUTSNO

          LATEX RISK : DO YOU HAVE A PREVIOUS PERSONAL HISTORY OF MORE THAN

          NINE SURGERIES, SPINA BIFIDA, OR REPEATED CATHERIZATIONS? YES

          - PLEASE INDICATE : > 9 SURGERIES

          LATEX RISK : ARE YOU FREQUENTLY EXPOSED TO LATEX PRODUCTS IN YOUR

          OCCUPATION?NO

          DATE ASKED : 09/10/2019 LATEX ALLERGY

           

           

          CAFFEINE

          CAFFEINE USE?YES

          HOW OFTEN AND HOW MUCH? 4 PEPSI'S PER DAY

           

           

          ADVANCE DIRECTIVE

          ADVANCE DIRECTIVE DISCUSSED WITH PATIENT:YES PATIENT DECLINES HCP

          INFORMATION AT THIS TIME.

           

           

          [facility]

          [id number]

           

           

          ALCOHOL SCREENING

          DID YOU HAVE A DRINK CONTAINING ALCOHOL IN THE PAST YEAR?NO

          POINTS0

          INTERPRETATIONNEGATIVE

           

           

          SEXUAL HX

          HAD SEX IN THE LAST 12 MONTHS (VAGINAL, ORAL, OR ANAL)?YES

          WITHMEN ONLY

          USE PROTECTION?NO

          HAVE YOU EVER HAD AN STD?NO

           

           

          REVIEWED WITH PATIENT 18 1309 JSREVIEWED WITH PATIENT

          19 [id number] JS.

           

HOSPITALIZATION/MAJOR DIAGNOSTIC PROCEDURE

           

          SURGERY RELATED

          CHEST PAIN

          CHILD BIRTH

           

REVIEW OF SYSTEMS

           

      REVIEWED BY:

           

          PROVIDER:    MONICA CORBETT .

           

      CONSTITUTIONAL:

           

          ANY CHANGE IN YOUR MEDICAL CONDITION?    NO . CHILLS    NO .

          FEVER    NO .

           

      INFECTION:

           

          DO YOU HAVE NEW INFECTIONS?    NO . DO YOU HAVE HISTORY OF MRSA? 

            NO .

           

      MUSCULOSKELETAL:

           

          ANY NEW PATTERNS OF PAIN OR NUMBNESS?    NO .

           

      GASTROENTEROLOGY:

           

          ANY NEW CHANGE IN BOWEL CONTROL?    NO .

           

      GENITOURINARY:

           

          ANY NEW CHANGE IN BLADDER CONTROL?    YES, PT STATES THAT SHE IS

          HAVING ISSUES WITH VOIDING, MD IN [place] NOTIFIED PT TO NOT USE

          CATHETERS ANY LONGER, PT STATES THAT SHE GETS VERY UNCOMFORTABLE

          DUE TO BLADDER BEING TOO FULL. . IS THERE A CHANCE YOU COULD BE

          PREGNANT?    NO .

           

      HEMATOLOGY/LYMPH:

           

          DO YOU TAKE ANY BLOOD THINNERS? (FOR EXAMPLE- COUMADIN, PLAVIX,

          AGGRENOX, PLATEL, PRADAXA, OR XARELTO)    NO . WHEN WAS YOUR LAST

          DOSE?    DATE: TIME:  .

           

      NEUROLOGY:

           

          HAVE YOU FALLEN IN THE PAST 12 MONTHS?    NO . ANY NEW EXTREMITY

          NUMBNESS OR WEAKNESS?    NO .

           

      CARDIOLOGY:

           

          DO YOU HAVE A PACEMAKER OR DEFIBRILLATOR?    NO .

           

      RESPIRATORY:

           

          HAVE YOU BEEN SICK IN THE PAST WEEK?    YES, DUE TO PAIN FROM

          BLADDER . FEVER    NO . FLU LIKE SYMPTOMS?    NO . COUGH    NO .

           

      INTEGUMENTARY:

           

          DO YOU HAVE ANY RASHES OR OPEN SORES?    NO .

           

      ALLERGIC/IMMUNO:

           

          ARE YOU ALLERGIC TO IV DYE?    YES . ANY NEW ALLERGIES?    NO .

           

      PSYCHIATRIC:

           

          DO YOU HAVE THOUGHTS OF HURTING YOURSELF OR SOMEONE ELSE?    NO .

          ARE YOU ABUSED, NEGLECTED, OR IN AN UNSAFE ENVIRONMENT?    NO .

           

      ENDOCRINOLOGY:

           

          ARE YOU DIABETIC?    YES, FSBS 130 PT CHECKS DAILY .

           

      OTHER:

           

          DO YOU NEED ANY PRESCRIPTIONS?    NO . IF YES, PLEASE LIST:   

          ____ . ANY NEW PROBLEMS WITH YOUR MEDICATIONS?    NO . WHEN DID

          YOU LAST EAT?    ____ . WHEN DID YOU LAST DRINK?    ____ . WHAT

          DID YOU LAST DRINK?    ____ . NAME OF PERSON DRIVING YOU HOME?   

          ____ . DO YOU HAVE ANY OTHER QUESTIONS OR CONCERNS    NO .

           

VITAL SIGNS

           

          .6 LBS, HT 64 IN, BMI 31.00 INDEX, /99 MM HG, 

          /MIN, RR 18 /MIN, TEMP 97.2 F, OXYGEN SAT % 96%, SAFE IN ENV?

          (Y/N) Y, NA INITIALS SC 13:13, REVIEWED BY: JIN.

           

EXAMINATION

           

      GENERAL EXAMINATION:

          LUNGS:LUNG SOUNDS ARE CLEAR.

           

          HEART:HEART RATE REGULAR.

           

ASSESSMENTS

           

          INTERSTITIAL CYSTITIS - N30.10 (PRIMARY)

           

TREATMENT

           

      INTERSTITIAL CYSTITIS

          REFILL MORPHINE SULFATE TABLET, 15 MG, 1 TABLET AS NEEDED,

          ORALLY, Q6H PRN MDD4, 30 DAY(S), 120, REFILLS 0

          REFILL TRAMADOL HCL TABLET, 50 MG, 1 TABLET AS NEEDED, ORALLY,

          Q6H PRN MDD4, 30 DAYS, 120, REFILLS 2

          CONTINUE GABAPENTIN TABLET, 600 MG, 1 TABLET, ORALLY, THREE TIMES

          A DAY

          NOTES: ISTOP REGISTRY REVIEWED AND DEMONSTRATES COMPLLIANCE.

          BRINGS IN MEDICATIONS WHICH IS APPROPRIATE FOR WHAT WAS

          DISPENSED. RECENT URINE TOXICOLOGY REVIEWED. NO UNAUTHORIZED

          MEDICATIONS. NO ILLICIT SUBSTANCES AND PRESCRIBED MEDICATIONS

          WERE PRESENT. URINE TOX TODAY, RISKS AND BENEFITS OF

          NARCOTIC/OPIOD MEDICATIONS WERE REVIEWED WITH PATIENT - THIS

          INCLUDES BUT IS NOT LIMITED TO RISK OF DEPENDANCE/DEVELOPMENT OF

          ADDICTION, MOOD DISTURBANCE AND DEPRESSION, OSTEOPOROSIS,

          HORMONAL AND LABIDAL CHANGES, RESPIRATORY DEPRESSION AND DEATH.

          PATIENT IS ADVISED NOT TO DRIVE OR DRINK ALCOHOL WHILE ON THESE

          MEDICATIONS.

       REFERRAL TO:OF [facility]

          [facility]

           REASON:CHRONIC BLADDER PAIN-INTERSTITIAL CYSTITIS

           

PROCEDURE CODES

           

           ESTABILISHED PATIENT [facility] FACILITY CHARGE

           

DISPOSITION & COMMUNICATION

           

FOLLOW UP

           

          NO F/U NECESSARY (REASON: REFER TO PALLIATIVE CARE)

           

 

ELECTRONICALLY SIGNED BY CHELLE GARCIA ON

          2019 AT 04:04 PM EDT

           

           

           

 

DISCLAIMER :

THIS IS A VISIT SUMMARY EXTRACTED FROM THE [employer] CHART.

IT IS NOT A COPY OF THE [employer] PROGRESS NOTE.

DWAINE